# Patient Record
Sex: FEMALE | Race: WHITE | NOT HISPANIC OR LATINO | Employment: FULL TIME | URBAN - METROPOLITAN AREA
[De-identification: names, ages, dates, MRNs, and addresses within clinical notes are randomized per-mention and may not be internally consistent; named-entity substitution may affect disease eponyms.]

---

## 2023-03-10 ENCOUNTER — TELEPHONE (OUTPATIENT)
Dept: PRIMARY CARE | Facility: CLINIC | Age: 53
End: 2023-03-10
Payer: COMMERCIAL

## 2023-03-10 NOTE — TELEPHONE ENCOUNTER
Left a voicemail for the patient with the information below   ----- Message from Briseida White MD sent at 3/7/2023  5:32 PM EST -----  Left message for patient to call.  Her mammogram was normal, we will repeat it next year.

## 2023-08-15 ENCOUNTER — OFFICE VISIT (OUTPATIENT)
Dept: PRIMARY CARE | Facility: CLINIC | Age: 53
End: 2023-08-15
Payer: COMMERCIAL

## 2023-08-15 ENCOUNTER — TELEPHONE (OUTPATIENT)
Dept: PRIMARY CARE | Facility: CLINIC | Age: 53
End: 2023-08-15

## 2023-08-15 VITALS
DIASTOLIC BLOOD PRESSURE: 93 MMHG | HEART RATE: 80 BPM | HEIGHT: 61 IN | SYSTOLIC BLOOD PRESSURE: 136 MMHG | BODY MASS INDEX: 24.35 KG/M2 | TEMPERATURE: 97.9 F | OXYGEN SATURATION: 99 % | WEIGHT: 129 LBS

## 2023-08-15 DIAGNOSIS — M21.70 LEG LENGTH DISCREPANCY: Primary | ICD-10-CM

## 2023-08-15 DIAGNOSIS — Z11.59 NEED FOR HEPATITIS C SCREENING TEST: ICD-10-CM

## 2023-08-15 DIAGNOSIS — I10 BENIGN ESSENTIAL HYPERTENSION: ICD-10-CM

## 2023-08-15 PROBLEM — E04.2 MULTINODULAR THYROID: Status: ACTIVE | Noted: 2023-08-15

## 2023-08-15 PROBLEM — G47.33 OBSTRUCTIVE SLEEP APNEA: Status: ACTIVE | Noted: 2023-08-15

## 2023-08-15 PROBLEM — T78.3XXA ANGIOEDEMA OF LIPS: Status: ACTIVE | Noted: 2023-08-15

## 2023-08-15 PROBLEM — L40.9 PSORIASIS: Status: ACTIVE | Noted: 2023-08-15

## 2023-08-15 PROBLEM — F41.1 GAD (GENERALIZED ANXIETY DISORDER): Status: ACTIVE | Noted: 2023-08-15

## 2023-08-15 PROCEDURE — 99214 OFFICE O/P EST MOD 30 MIN: CPT | Performed by: FAMILY MEDICINE

## 2023-08-15 PROCEDURE — 3080F DIAST BP >= 90 MM HG: CPT | Performed by: FAMILY MEDICINE

## 2023-08-15 PROCEDURE — 3075F SYST BP GE 130 - 139MM HG: CPT | Performed by: FAMILY MEDICINE

## 2023-08-15 RX ORDER — CHLORTHALIDONE 25 MG/1
25 TABLET ORAL DAILY
Qty: 30 TABLET | Refills: 1 | Status: SHIPPED | OUTPATIENT
Start: 2023-08-15 | End: 2023-10-14

## 2023-08-15 ASSESSMENT — ENCOUNTER SYMPTOMS
UNEXPECTED WEIGHT CHANGE: 0
ABDOMINAL PAIN: 0
SLEEP DISTURBANCE: 1
SHORTNESS OF BREATH: 0
NECK PAIN: 1
PALPITATIONS: 0
COUGH: 0
FATIGUE: 1

## 2023-08-15 ASSESSMENT — PATIENT HEALTH QUESTIONNAIRE - PHQ9
1. LITTLE INTEREST OR PLEASURE IN DOING THINGS: NOT AT ALL
SUM OF ALL RESPONSES TO PHQ9 QUESTIONS 1 AND 2: 0
2. FEELING DOWN, DEPRESSED OR HOPELESS: NOT AT ALL

## 2023-08-15 NOTE — PROGRESS NOTES
" Subjective   Patient ID: Wanda Stearns is a 53 y.o. female who presents for Foot Orthotics (Pt needs referral for Orthotics ).    Wanda is here because she has a history of leg length discrepancy and needs a referral for PT to have custom orthotics.    She also needs follow up on her blood pressure.  She is struggling with her sleep apnea, is in process of being evaluated for the Inspire device because she has a really hard time tolerating the CPAP.  She stopped the atenolol because she is very tired to start with and felt the atenolol made her even more tired and sleepy.        Review of Systems   Constitutional:  Positive for fatigue. Negative for unexpected weight change.   Respiratory:  Negative for cough and shortness of breath.    Cardiovascular:  Negative for chest pain and palpitations.   Gastrointestinal:  Negative for abdominal pain.   Musculoskeletal:  Positive for neck pain.   Psychiatric/Behavioral:  Positive for sleep disturbance.        Objective     BP (!) 136/93   Pulse 80   Temp 36.6 °C (97.9 °F)   Ht 1.549 m (5' 1\")   Wt 58.5 kg (129 lb)   SpO2 99%   BMI 24.37 kg/m²     Physical Exam  Constitutional:       General: She is not in acute distress.  Neck:      Thyroid: No thyromegaly.   Cardiovascular:      Rate and Rhythm: Normal rate and regular rhythm.      Heart sounds: No murmur heard.  Pulmonary:      Effort: No respiratory distress.      Breath sounds: Normal breath sounds.   Lymphadenopathy:      Cervical: No cervical adenopathy.   Neurological:      Mental Status: She is alert.             Assessment/Plan   Problem List Items Addressed This Visit       Benign essential hypertension    Current Assessment & Plan     Blood pressure is too high.  Will start a small dose of chlorthalidone which is unlikely to cause fatigue.  Warned re muscle cramping.  Labs ordered.  F/U 1 month for recheck.         Relevant Medications    chlorthalidone (Hygroton) 25 mg tablet    Other Relevant Orders "    Lipid Panel    Comprehensive Metabolic Panel    TSH with reflex to Free T4 if abnormal    Leg length discrepancy - Primary    Relevant Orders    Referral to Physical Therapy     Other Visit Diagnoses       Need for hepatitis C screening test        Relevant Orders    Hepatitis C Antibody

## 2023-08-15 NOTE — ASSESSMENT & PLAN NOTE
Blood pressure is too high.  Will start a small dose of chlorthalidone which is unlikely to cause fatigue.  Warned re muscle cramping.  Labs ordered.  F/U 1 month for recheck.

## 2023-09-07 DIAGNOSIS — I10 BENIGN ESSENTIAL HYPERTENSION: ICD-10-CM

## 2023-09-07 RX ORDER — CHLORTHALIDONE 25 MG/1
25 TABLET ORAL DAILY
Qty: 30 TABLET | Refills: 1 | OUTPATIENT
Start: 2023-09-07

## 2023-09-21 ENCOUNTER — LAB (OUTPATIENT)
Dept: LAB | Facility: LAB | Age: 53
End: 2023-09-21
Payer: COMMERCIAL

## 2023-09-21 ENCOUNTER — OFFICE VISIT (OUTPATIENT)
Dept: PRIMARY CARE | Facility: CLINIC | Age: 53
End: 2023-09-21
Payer: COMMERCIAL

## 2023-09-21 VITALS
WEIGHT: 130 LBS | DIASTOLIC BLOOD PRESSURE: 85 MMHG | OXYGEN SATURATION: 98 % | SYSTOLIC BLOOD PRESSURE: 128 MMHG | TEMPERATURE: 98 F | HEIGHT: 61 IN | HEART RATE: 70 BPM | BODY MASS INDEX: 24.55 KG/M2

## 2023-09-21 DIAGNOSIS — I10 BENIGN ESSENTIAL HYPERTENSION: Primary | ICD-10-CM

## 2023-09-21 DIAGNOSIS — Z23 NEED FOR VACCINATION: ICD-10-CM

## 2023-09-21 DIAGNOSIS — I10 BENIGN ESSENTIAL HYPERTENSION: ICD-10-CM

## 2023-09-21 DIAGNOSIS — Z11.59 NEED FOR HEPATITIS C SCREENING TEST: ICD-10-CM

## 2023-09-21 LAB
ALANINE AMINOTRANSFERASE (SGPT) (U/L) IN SER/PLAS: 8 U/L (ref 7–45)
ALBUMIN (G/DL) IN SER/PLAS: 4.6 G/DL (ref 3.4–5)
ALKALINE PHOSPHATASE (U/L) IN SER/PLAS: 66 U/L (ref 33–110)
ANION GAP IN SER/PLAS: 13 MMOL/L (ref 10–20)
ASPARTATE AMINOTRANSFERASE (SGOT) (U/L) IN SER/PLAS: 14 U/L (ref 9–39)
BILIRUBIN TOTAL (MG/DL) IN SER/PLAS: 0.3 MG/DL (ref 0–1.2)
CALCIUM (MG/DL) IN SER/PLAS: 9.7 MG/DL (ref 8.6–10.6)
CARBON DIOXIDE, TOTAL (MMOL/L) IN SER/PLAS: 27 MMOL/L (ref 21–32)
CHLORIDE (MMOL/L) IN SER/PLAS: 105 MMOL/L (ref 98–107)
CHOLESTEROL (MG/DL) IN SER/PLAS: 205 MG/DL (ref 0–199)
CHOLESTEROL IN HDL (MG/DL) IN SER/PLAS: 79 MG/DL
CHOLESTEROL/HDL RATIO: 2.6
CREATININE (MG/DL) IN SER/PLAS: 0.73 MG/DL (ref 0.5–1.05)
GFR FEMALE: >90 ML/MIN/1.73M2
GLUCOSE (MG/DL) IN SER/PLAS: 88 MG/DL (ref 74–99)
HEPATITIS C VIRUS AB PRESENCE IN SERUM: NONREACTIVE
LDL: 113 MG/DL (ref 0–99)
POTASSIUM (MMOL/L) IN SER/PLAS: 4.2 MMOL/L (ref 3.5–5.3)
PROTEIN TOTAL: 7.2 G/DL (ref 6.4–8.2)
SODIUM (MMOL/L) IN SER/PLAS: 141 MMOL/L (ref 136–145)
THYROTROPIN (MIU/L) IN SER/PLAS BY DETECTION LIMIT <= 0.05 MIU/L: 1.4 MIU/L (ref 0.44–3.98)
TRIGLYCERIDE (MG/DL) IN SER/PLAS: 65 MG/DL (ref 0–149)
UREA NITROGEN (MG/DL) IN SER/PLAS: 14 MG/DL (ref 6–23)
VLDL: 13 MG/DL (ref 0–40)

## 2023-09-21 PROCEDURE — 3079F DIAST BP 80-89 MM HG: CPT | Performed by: FAMILY MEDICINE

## 2023-09-21 PROCEDURE — 86803 HEPATITIS C AB TEST: CPT

## 2023-09-21 PROCEDURE — 80061 LIPID PANEL: CPT

## 2023-09-21 PROCEDURE — 90471 IMMUNIZATION ADMIN: CPT | Performed by: FAMILY MEDICINE

## 2023-09-21 PROCEDURE — 36415 COLL VENOUS BLD VENIPUNCTURE: CPT

## 2023-09-21 PROCEDURE — 80053 COMPREHEN METABOLIC PANEL: CPT

## 2023-09-21 PROCEDURE — 99213 OFFICE O/P EST LOW 20 MIN: CPT | Performed by: FAMILY MEDICINE

## 2023-09-21 PROCEDURE — 90686 IIV4 VACC NO PRSV 0.5 ML IM: CPT | Performed by: FAMILY MEDICINE

## 2023-09-21 PROCEDURE — 3074F SYST BP LT 130 MM HG: CPT | Performed by: FAMILY MEDICINE

## 2023-09-21 PROCEDURE — 84443 ASSAY THYROID STIM HORMONE: CPT

## 2023-09-21 ASSESSMENT — ENCOUNTER SYMPTOMS
ABDOMINAL PAIN: 0
UNEXPECTED WEIGHT CHANGE: 0
SHORTNESS OF BREATH: 0
NECK PAIN: 1
COUGH: 0
FATIGUE: 1
SLEEP DISTURBANCE: 1
PALPITATIONS: 0

## 2023-09-21 ASSESSMENT — COLUMBIA-SUICIDE SEVERITY RATING SCALE - C-SSRS
2. HAVE YOU ACTUALLY HAD ANY THOUGHTS OF KILLING YOURSELF?: NO
6. HAVE YOU EVER DONE ANYTHING, STARTED TO DO ANYTHING, OR PREPARED TO DO ANYTHING TO END YOUR LIFE?: NO
1. IN THE PAST MONTH, HAVE YOU WISHED YOU WERE DEAD OR WISHED YOU COULD GO TO SLEEP AND NOT WAKE UP?: NO

## 2023-09-21 NOTE — ASSESSMENT & PLAN NOTE
Blood pressure is well controlled, will get her labs and make sure renal function is OK.  Plan to continue the same medication.

## 2023-09-21 NOTE — PROGRESS NOTES
" Subjective   Patient ID: Wanda Stearns is a 53 y.o. female who presents for Follow-up and Hypertension (F/U BP).    Wanda is here to follow up on her blood pressure.  She is struggling with her sleep apnea, is in process of being evaluated for the Inspire device because she has a really hard time tolerating the CPAP.  She reports the chlorthalidone is working well and is well tolerated.  She is under tremendous stress, her  was recently diagnosed with prostate cancer.        Review of Systems   Constitutional:  Positive for fatigue. Negative for unexpected weight change.   Respiratory:  Negative for cough and shortness of breath.    Cardiovascular:  Negative for chest pain and palpitations.   Gastrointestinal:  Negative for abdominal pain.   Musculoskeletal:  Positive for neck pain.   Psychiatric/Behavioral:  Positive for sleep disturbance.        Objective     /85   Pulse 70   Temp 36.7 °C (98 °F)   Ht 1.549 m (5' 1\")   Wt 59 kg (130 lb)   SpO2 98%   BMI 24.56 kg/m²     Physical Exam  Constitutional:       General: She is not in acute distress.  Neck:      Thyroid: No thyromegaly.   Cardiovascular:      Rate and Rhythm: Normal rate and regular rhythm.      Heart sounds: No murmur heard.  Pulmonary:      Effort: No respiratory distress.      Breath sounds: Normal breath sounds.   Lymphadenopathy:      Cervical: No cervical adenopathy.   Neurological:      Mental Status: She is alert.             Assessment/Plan   Problem List Items Addressed This Visit       Benign essential hypertension - Primary    Current Assessment & Plan     Blood pressure is well controlled, will get her labs and make sure renal function is OK.  Plan to continue the same medication.          Other Visit Diagnoses       Need for vaccination        Relevant Orders    Flu vaccine (IIV4) age 6 months and greater, preservative free (Completed)                "

## 2023-10-10 ENCOUNTER — HOSPITAL ENCOUNTER (OUTPATIENT)
Dept: RADIOLOGY | Facility: HOSPITAL | Age: 53
Discharge: HOME | End: 2023-10-10
Payer: COMMERCIAL

## 2023-10-10 DIAGNOSIS — G47.33 OBSTRUCTIVE SLEEP APNEA: ICD-10-CM

## 2023-10-10 DIAGNOSIS — G47.33 OBSTRUCTIVE SLEEP APNEA (ADULT) (PEDIATRIC): ICD-10-CM

## 2023-10-10 PROCEDURE — 70486 CT MAXILLOFACIAL W/O DYE: CPT | Performed by: RADIOLOGY

## 2023-10-10 PROCEDURE — 76377 3D RENDER W/INTRP POSTPROCES: CPT | Performed by: RADIOLOGY

## 2023-10-10 PROCEDURE — 76377 3D RENDER W/INTRP POSTPROCES: CPT

## 2023-10-10 PROCEDURE — 70486 CT MAXILLOFACIAL W/O DYE: CPT

## 2023-10-13 ENCOUNTER — TELEPHONE (OUTPATIENT)
Dept: OTOLARYNGOLOGY | Facility: HOSPITAL | Age: 53
End: 2023-10-13
Payer: COMMERCIAL

## 2023-10-13 NOTE — TELEPHONE ENCOUNTER
Patient scheduled for an appt 10/31st at 11:30. Time changed to 11am there was a conflict in the provider schedule. Left patient detailed vm with new time.

## 2023-10-29 PROBLEM — M54.2 PAIN, NECK: Status: ACTIVE | Noted: 2023-10-29

## 2023-10-29 PROBLEM — R92.8 ABNORMAL MAMMOGRAM: Status: ACTIVE | Noted: 2023-10-29

## 2023-10-29 PROBLEM — E04.9 GOITER: Status: ACTIVE | Noted: 2023-10-29

## 2023-10-29 PROBLEM — R53.83 OTHER FATIGUE: Status: ACTIVE | Noted: 2023-10-29

## 2023-10-29 RX ORDER — HYALURONIC ACID SODIUM SALT 0.5% / NIACINAMIDE 4% / TRETINOIN 0.05% .5; 4; .05 G/100G; G/100G; G/100G
CREAM TOPICAL NIGHTLY
COMMUNITY

## 2023-10-29 RX ORDER — BISMUTH SUBSALICYLATE 262 MG
1 TABLET,CHEWABLE ORAL DAILY
COMMUNITY

## 2023-10-29 RX ORDER — ATENOLOL 25 MG/1
25 TABLET ORAL DAILY
COMMUNITY

## 2023-10-29 RX ORDER — TRIAMCINOLONE ACETONIDE 1 MG/G
CREAM TOPICAL 2 TIMES DAILY
COMMUNITY

## 2023-10-30 NOTE — H&P (VIEW-ONLY)
Follow up visit prior to surgery    Patient presents today for pre-op appointment to Uvulopalatopharyngoplasty    The role of palatoplasty to reduce ARPIT burden was discussed. Risks including post-op bleeding, post-op significant pain, changes in taste and continuous tongue pain, amongst others were discussed and all questions answered.    Patient expresses understanding and after considering risks, benefits, and alternatives, surgical management was elected.     Orders   · CT Facial Bones; Status:Hold For - Scheduling,Retrospective By Protocol Authorization;  Requested for:20Sep2023;   Patient taking Metformin or Derivatives? : Unknown  Radiologist to Determine Optimal Study : Y  What are the patient's signs and symptoms? : ARPIT   · Pain Management Referral Evaluation and Treatment  Evaluate & Treat for post op pain  management due to pt prefers no narcotics after surgery UPPP due to recovering ETOH   Status: Hold For - Scheduling,Retrospective By Protocol Authorization  Requested for:  20Sep2023    Patient Discussion/Summary    This is a 53 year old female with a history of severe ARPIT and CPAP intolerance who presented to Dr. Corado for surgical options for ARPIT management. We discussed that given her anatomy, first line of treatment would include a UPPP. We counseled her that although we feel this procedure is clearly indicated, given her degree of apnea, we cannot guarantee that this will be entirely curative. She discussed with us her history of alcoholism and concern regarding postoperative pain control with narcotics after UPPP. She also has an allergy to NSAIDS. We will refer her to pain management to assist with this. We have also ordered a CT face to evaluate her prior genioplasty to help determine if she could benefit from bony work at the same time. Will see her back once more before surgery.      Chief Complaint    New patient visit for sleep apnea, intolerance to PAP, large tonsils and narrow airway     "  History of Present IllnessMs. STEARNS ,  1970, referred for an initial consultation with a long history of reported loud snoring, waking up feeling unrefreshed, excessive daytime fatigue. Grampian Sleepiness Scale Score is 16 /24. Fatigue Severity Scale Score is 43 /63. Snoring VAS 10/10    Sleep study histories: (personally reviewed raw data such as interpretation report, data sheet, hypnogram, and titration table)  The patient has been previously diagnosed with obstructive sleep apnea (ARPIT), in a home sleep test performed on 2023, that showed an Apnea Hypopnea Index (AHI) of 68.1 events per hour. The 3% oxygen desaturation index (RIK) was 66.1 events per hour. During that night, the lowest 02 saturation was 75 %, and the patient spent 60 minutes of total sleep time with 02 sat less than 88%. During supine sleep the AHI was 78.3 events per hour and non supine sleep was 35.7 events per hour    For nocturnal symptoms (without or prior to treatment), she endorses that there is  snoring, witnessed apneas, nocturia and restless sleep.    For daytime symptoms (without or prior to treatment), he endorses that there is morning headaches, irritability and depression.    Patient does reports nasal obstruction. It does fluctuate, and has been treated with nasal corticosteroids without significant improvements. He does not have a history of nasal surgery or upper airway surgery. He does not have a history of nasal trauma. He does not describe a history of facial pressure type of headaches. He does not report a history of nasal drainage, denies epistaxis.   NOSE-   Nasal Obstruction VAS- 1/10    Ms. Stearns presents with severe ARPIT, she is compliant but intolerant to CPAP. She feels fatigued throughout the day despite wearing the CPAP. She has a history of rhinoplasty and genioplasty in the past. Her mother has a \"congenital narrowing of the airway\" and she feels she has a similar condition. She denies " any nasal obstruction, breathes well through her nose. She had braces as a child and still wears a retainer. She has not tried an oral appliance. 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 Smokes 2-3 cigarettes per day.      Active Problems   · Abnormal mammogram (793.80) (R92.8)   · Angioedema of lips, initial encounter (995.1) (T78.3XXA)   · Benign essential hypertension (401.1) (I10)   · Encounter for screening mammogram for breast cancer (V76.12) (Z12.31)   · BOB (generalized anxiety disorder) (300.02) (F41.1)   · Goiter (240.9) (E04.9)   · Lower limb length difference (736.81) (M21.70)   · Menopause (627.2) (Z78.0)   · Multinodular thyroid (241.1) (E04.2)   · Obstructive sleep apnea (327.23) (G47.33)   · Other fatigue (780.79) (R53.83)   · Pain, neck (723.1) (M54.2)   · Psoriasis (696.1) (L40.9)    Past Medical History   · History of varicella (V12.09) (Z86.19)    Surgical History   · History of Repair Of Extraoral Mandible With Bone Plate   · History of Rhinoplasty    Family History   · Family history of hypertension (V17.49) (Z82.49)   · Family history of hypothyroidism (V18.19) (Z83.49)   · Family history of Seasonal allergic rhinitis, unspecified allergic rhinitis trigger   · Family history of Alcohol abuse, in remission   · Family history of Anxiety   · Family history of Diabetes mellitus of other type without complication   · Family history of depression (V17.0) (Z81.8)   · Family history of hypertension (V17.49) (Z82.49)   · Family history of Obsessive-compulsive behavior   · Family history of Seasonal allergic rhinitis, unspecified allergic rhinitis trigger   · Family history of Anxiety   · Family history of depression (V17.0) (Z81.8)   · Family history of hemochromatosis (V18.19) (Z83.49)   · Family history of dementia (V17.2) (Z81.8)   · Family history of Alcohol abuse   · Family history of Alcohol abuse   · Family history of Anxiety   · Family history of depression (V17.0) (Z81.8)   · Family  history of Anxiety   · Family history of depression (V17.0) (Z81.8)    Social History   · Alcoholism in recovery (303.93) (F10.21)   · Caffeine use (V49.89) (Z78.9)   · Current smoker on some days (305.1) (F17.200)   · Does not use illicit drugs (V49.89) (Z78.9)   · Denied: History of Drug use   · Feels safe at home   · No alcohol use    Allergies   · Ibuprofen TABS  Recorded By: Carolyn Hirsch; 12/22/2016 4:27:59 PM    Current Meds    Medication Name Instruction   Atenolol 25 MG Oral Tablet TAKE 1 TABLET DAILY.   EPINEPHrine 0.3 MG/0.3ML Injection Solution Auto-injector USE AS DIRECTED   Mometasone Furoate 0.1 % External Ointment APPLY THIN LAYER TO AFFECTED AREAS ON FACE TWICE A DAY X 7 DAYS, THEN DISCONTINUE.   Multi-Vitamin/Iron Oral Tablet TAKE 1 TABLET ONCE DAILY.   Tretinoin 0.05 % External Cream APPLY A PEA SIZED AMOUNT TO ENTIRE FACE EVERY NIGHT   Triamcinolone Acetonide 0.1 % External Cream APPLY TO AFFECTED AREA TWICE A DAY     Vitals  Vital Signs    Recorded: [id number] 11:10AM   Temperature 98.3 F   Weight 129 lb 9.6 oz   BMI Calculated 24.49 kg/m2   BSA Calculated 1.57   Tobacco Use a) Yes   PHQ-2  #1. Over the last 2 weeks have you felt down, depressed or hopeless?  (If yes, answer PHQ-9 below) Yes   PHQ-2  #2. Over the last 2 weeks have you felt little interest  or pleasure in doing things?  (If yes, answer PHQ-9 below) Yes   Falls Screening (Age 18+) a) No falls within the last year     Physical Exam  GENERAL: Well-developed, well-nourished.      EYES: Ocular motility intact.      EARS: Otoscopy of external auditory canals and tympanic membranes is normal with clinical speech reception thresholds grossly intact. No mass/lesion of auricle.      NOSE:   Anterior rhinoscopy shows there is left septal deviation and bilateral inferior turbinate hypertrophy.  Right Turbinate: 2  Left Turbinate: 2      NECK: No cervical lymphadenopathy, no neck mass/crepitus/ asymmetry, trachea is midline, no thyroid  enlargement/tenderness/mass.      OROPHARYNX:   Tonsil size: 3+  Modified Mallampatti tongue position: 2  Tongue position is Freidman 2  Scalloping is noted.   Soft Palate: is midly high arched    MAXILLOFACIAL:   On frontal repose, patient shows symmetrical facial thirds with retrusion of maxilla. Symmetrical facial fifths.  There is not paranasal flattening.  There is not deep nasolabial folds.       DENTAL:  The occlusal plane is not steep. Overjet and Overbite are adequate.   Right - Class 2 canine, Class 2 molar  Left - Class 2 canine, Class 2 molar  There is transverse discrepancy with narrow maxilla.  The maxilla is highly-arched.   Tongue shows teeth indentations.   Dentition: fair     NEURO/PSYCH: Cranial nerves grossly intact, oriented x3 (time, place, person), appropriate mood and affect.      RESPIRATION: Symmetric expansion during respiration, normal respiratory effort.      CARDIOVASCULAR: Pulse is regular rhythm and rate      Procedure: Diagnostic Nasal/ Pharyngeal Endoscopy     Indication for procedure: To evaluate static and dynamic upper anatomy not visible by anterior rhinoscopy and oral cavitiy examination for anatomic risk factors of obstructive sleep apnea. Also to evaluate extension of polyposis.      Anesthesia: 1% phenylephrine,4% lidocaine topical spray     Description:   A flexible endoscope was used to examine the left and right nasal cavities. The nasal valve areas were examined for abnormalities or collapse. The inferior turbinates are significantly hypertrophied. Several polyps in both meatus are noted with drainage of thick mucus from middle and superior meatuses. The scope was then passed through the nasopharyngeal, oropharyngeal, and hypopharyngeal airway. The Aparicio Maneuver was performed with the patient in sitting position.Collapse was assessed during a maximal inspiratory effort against a closed mouth and sealed nose. The patient tolerated the procedure without complications and  was returned to ambulatory status.      Nasopharynx: There is not adenoid hypertrophy.   Oropharynx: There is severe palatine tonsillar hypertrophy.   Hypopharynx: There is moderate lingual tonsillar hypertrophy, with lingual tonsils of Grade 1. Vocal Cord position with Grade 1 view. With Mehta maneuver, base of tongue collapse is grade 1.          'Scores and Scales'  -Sleep QOL Set    Goal [id number]   NOSE (20) TS     Snoring VAS TS     ESS (24) TS  11   GLENDA (28) TS  22   FOSQ (40) TS  26

## 2023-10-30 NOTE — PROGRESS NOTES
Follow up visit prior to surgery    Patient presents today for pre-op appointment to Uvulopalatopharyngoplasty    The role of palatoplasty to reduce ARPIT burden was discussed. Risks including post-op bleeding, post-op significant pain, changes in taste and continuous tongue pain, amongst others were discussed and all questions answered.    Patient expresses understanding and after considering risks, benefits, and alternatives, surgical management was elected.     Orders   · CT Facial Bones; Status:Hold For - Scheduling,Retrospective By Protocol Authorization;  Requested for:20Sep2023;   Patient taking Metformin or Derivatives? : Unknown  Radiologist to Determine Optimal Study : Y  What are the patient's signs and symptoms? : ARPIT   · Pain Management Referral Evaluation and Treatment  Evaluate & Treat for post op pain  management due to pt prefers no narcotics after surgery UPPP due to recovering ETOH   Status: Hold For - Scheduling,Retrospective By Protocol Authorization  Requested for:  20Sep2023    Patient Discussion/Summary    This is a 53 year old female with a history of severe ARPIT and CPAP intolerance who presented to Dr. Corado for surgical options for ARPIT management. We discussed that given her anatomy, first line of treatment would include a UPPP. We counseled her that although we feel this procedure is clearly indicated, given her degree of apnea, we cannot guarantee that this will be entirely curative. She discussed with us her history of alcoholism and concern regarding postoperative pain control with narcotics after UPPP. She also has an allergy to NSAIDS. We will refer her to pain management to assist with this. We have also ordered a CT face to evaluate her prior genioplasty to help determine if she could benefit from bony work at the same time. Will see her back once more before surgery.      Chief Complaint    New patient visit for sleep apnea, intolerance to PAP, large tonsils and narrow airway     "  History of Present IllnessMs. STEARNS ,  1970, referred for an initial consultation with a long history of reported loud snoring, waking up feeling unrefreshed, excessive daytime fatigue. Madison Sleepiness Scale Score is 16 /24. Fatigue Severity Scale Score is 43 /63. Snoring VAS 10/10    Sleep study histories: (personally reviewed raw data such as interpretation report, data sheet, hypnogram, and titration table)  The patient has been previously diagnosed with obstructive sleep apnea (ARPIT), in a home sleep test performed on 2023, that showed an Apnea Hypopnea Index (AHI) of 68.1 events per hour. The 3% oxygen desaturation index (RIK) was 66.1 events per hour. During that night, the lowest 02 saturation was 75 %, and the patient spent 60 minutes of total sleep time with 02 sat less than 88%. During supine sleep the AHI was 78.3 events per hour and non supine sleep was 35.7 events per hour    For nocturnal symptoms (without or prior to treatment), she endorses that there is  snoring, witnessed apneas, nocturia and restless sleep.    For daytime symptoms (without or prior to treatment), he endorses that there is morning headaches, irritability and depression.    Patient does reports nasal obstruction. It does fluctuate, and has been treated with nasal corticosteroids without significant improvements. He does not have a history of nasal surgery or upper airway surgery. He does not have a history of nasal trauma. He does not describe a history of facial pressure type of headaches. He does not report a history of nasal drainage, denies epistaxis.   NOSE-   Nasal Obstruction VAS- 1/10    Ms. Stearns presents with severe ARPIT, she is compliant but intolerant to CPAP. She feels fatigued throughout the day despite wearing the CPAP. She has a history of rhinoplasty and genioplasty in the past. Her mother has a \"congenital narrowing of the airway\" and she feels she has a similar condition. She denies " any nasal obstruction, breathes well through her nose. She had braces as a child and still wears a retainer. She has not tried an oral appliance. 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 Smokes 2-3 cigarettes per day.      Active Problems   · Abnormal mammogram (793.80) (R92.8)   · Angioedema of lips, initial encounter (995.1) (T78.3XXA)   · Benign essential hypertension (401.1) (I10)   · Encounter for screening mammogram for breast cancer (V76.12) (Z12.31)   · BOB (generalized anxiety disorder) (300.02) (F41.1)   · Goiter (240.9) (E04.9)   · Lower limb length difference (736.81) (M21.70)   · Menopause (627.2) (Z78.0)   · Multinodular thyroid (241.1) (E04.2)   · Obstructive sleep apnea (327.23) (G47.33)   · Other fatigue (780.79) (R53.83)   · Pain, neck (723.1) (M54.2)   · Psoriasis (696.1) (L40.9)    Past Medical History   · History of varicella (V12.09) (Z86.19)    Surgical History   · History of Repair Of Extraoral Mandible With Bone Plate   · History of Rhinoplasty    Family History   · Family history of hypertension (V17.49) (Z82.49)   · Family history of hypothyroidism (V18.19) (Z83.49)   · Family history of Seasonal allergic rhinitis, unspecified allergic rhinitis trigger   · Family history of Alcohol abuse, in remission   · Family history of Anxiety   · Family history of Diabetes mellitus of other type without complication   · Family history of depression (V17.0) (Z81.8)   · Family history of hypertension (V17.49) (Z82.49)   · Family history of Obsessive-compulsive behavior   · Family history of Seasonal allergic rhinitis, unspecified allergic rhinitis trigger   · Family history of Anxiety   · Family history of depression (V17.0) (Z81.8)   · Family history of hemochromatosis (V18.19) (Z83.49)   · Family history of dementia (V17.2) (Z81.8)   · Family history of Alcohol abuse   · Family history of Alcohol abuse   · Family history of Anxiety   · Family history of depression (V17.0) (Z81.8)   · Family  history of Anxiety   · Family history of depression (V17.0) (Z81.8)    Social History   · Alcoholism in recovery (303.93) (F10.21)   · Caffeine use (V49.89) (Z78.9)   · Current smoker on some days (305.1) (F17.200)   · Does not use illicit drugs (V49.89) (Z78.9)   · Denied: History of Drug use   · Feels safe at home   · No alcohol use    Allergies   · Ibuprofen TABS  Recorded By: Carolyn Hirsch; 12/22/2016 4:27:59 PM    Current Meds    Medication Name Instruction   Atenolol 25 MG Oral Tablet TAKE 1 TABLET DAILY.   EPINEPHrine 0.3 MG/0.3ML Injection Solution Auto-injector USE AS DIRECTED   Mometasone Furoate 0.1 % External Ointment APPLY THIN LAYER TO AFFECTED AREAS ON FACE TWICE A DAY X 7 DAYS, THEN DISCONTINUE.   Multi-Vitamin/Iron Oral Tablet TAKE 1 TABLET ONCE DAILY.   Tretinoin 0.05 % External Cream APPLY A PEA SIZED AMOUNT TO ENTIRE FACE EVERY NIGHT   Triamcinolone Acetonide 0.1 % External Cream APPLY TO AFFECTED AREA TWICE A DAY     Vitals  Vital Signs    Recorded: [id number] 11:10AM   Temperature 98.3 F   Weight 129 lb 9.6 oz   BMI Calculated 24.49 kg/m2   BSA Calculated 1.57   Tobacco Use a) Yes   PHQ-2  #1. Over the last 2 weeks have you felt down, depressed or hopeless?  (If yes, answer PHQ-9 below) Yes   PHQ-2  #2. Over the last 2 weeks have you felt little interest  or pleasure in doing things?  (If yes, answer PHQ-9 below) Yes   Falls Screening (Age 18+) a) No falls within the last year     Physical Exam  GENERAL: Well-developed, well-nourished.      EYES: Ocular motility intact.      EARS: Otoscopy of external auditory canals and tympanic membranes is normal with clinical speech reception thresholds grossly intact. No mass/lesion of auricle.      NOSE:   Anterior rhinoscopy shows there is left septal deviation and bilateral inferior turbinate hypertrophy.  Right Turbinate: 2  Left Turbinate: 2      NECK: No cervical lymphadenopathy, no neck mass/crepitus/ asymmetry, trachea is midline, no thyroid  enlargement/tenderness/mass.      OROPHARYNX:   Tonsil size: 3+  Modified Mallampatti tongue position: 2  Tongue position is Freidman 2  Scalloping is noted.   Soft Palate: is midly high arched    MAXILLOFACIAL:   On frontal repose, patient shows symmetrical facial thirds with retrusion of maxilla. Symmetrical facial fifths.  There is not paranasal flattening.  There is not deep nasolabial folds.       DENTAL:  The occlusal plane is not steep. Overjet and Overbite are adequate.   Right - Class 2 canine, Class 2 molar  Left - Class 2 canine, Class 2 molar  There is transverse discrepancy with narrow maxilla.  The maxilla is highly-arched.   Tongue shows teeth indentations.   Dentition: fair     NEURO/PSYCH: Cranial nerves grossly intact, oriented x3 (time, place, person), appropriate mood and affect.      RESPIRATION: Symmetric expansion during respiration, normal respiratory effort.      CARDIOVASCULAR: Pulse is regular rhythm and rate      Procedure: Diagnostic Nasal/ Pharyngeal Endoscopy     Indication for procedure: To evaluate static and dynamic upper anatomy not visible by anterior rhinoscopy and oral cavitiy examination for anatomic risk factors of obstructive sleep apnea. Also to evaluate extension of polyposis.      Anesthesia: 1% phenylephrine,4% lidocaine topical spray     Description:   A flexible endoscope was used to examine the left and right nasal cavities. The nasal valve areas were examined for abnormalities or collapse. The inferior turbinates are significantly hypertrophied. Several polyps in both meatus are noted with drainage of thick mucus from middle and superior meatuses. The scope was then passed through the nasopharyngeal, oropharyngeal, and hypopharyngeal airway. The Aparicio Maneuver was performed with the patient in sitting position.Collapse was assessed during a maximal inspiratory effort against a closed mouth and sealed nose. The patient tolerated the procedure without complications and  was returned to ambulatory status.      Nasopharynx: There is not adenoid hypertrophy.   Oropharynx: There is severe palatine tonsillar hypertrophy.   Hypopharynx: There is moderate lingual tonsillar hypertrophy, with lingual tonsils of Grade 1. Vocal Cord position with Grade 1 view. With Mehta maneuver, base of tongue collapse is grade 1.          'Scores and Scales'  -Sleep QOL Set    Goal [id number]   NOSE (20) TS     Snoring VAS TS     ESS (24) TS  11   GLENDA (28) TS  22   FOSQ (40) TS  26

## 2023-10-31 ENCOUNTER — OFFICE VISIT (OUTPATIENT)
Dept: OTOLARYNGOLOGY | Facility: CLINIC | Age: 53
End: 2023-10-31
Payer: COMMERCIAL

## 2023-10-31 VITALS — TEMPERATURE: 94.3 F | BODY MASS INDEX: 24.64 KG/M2 | HEIGHT: 61 IN | WEIGHT: 130.5 LBS

## 2023-10-31 DIAGNOSIS — G47.33 OSA (OBSTRUCTIVE SLEEP APNEA): Primary | ICD-10-CM

## 2023-10-31 PROCEDURE — 99214 OFFICE O/P EST MOD 30 MIN: CPT

## 2023-10-31 PROCEDURE — 3074F SYST BP LT 130 MM HG: CPT

## 2023-10-31 PROCEDURE — 3079F DIAST BP 80-89 MM HG: CPT

## 2023-10-31 ASSESSMENT — PATIENT HEALTH QUESTIONNAIRE - PHQ9
SUM OF ALL RESPONSES TO PHQ9 QUESTIONS 1 & 2: 0
1. LITTLE INTEREST OR PLEASURE IN DOING THINGS: NOT AT ALL
2. FEELING DOWN, DEPRESSED OR HOPELESS: NOT AT ALL

## 2023-11-09 ENCOUNTER — LAB (OUTPATIENT)
Dept: LAB | Facility: LAB | Age: 53
End: 2023-11-09
Payer: COMMERCIAL

## 2023-11-09 ENCOUNTER — TELEPHONE (OUTPATIENT)
Dept: OTOLARYNGOLOGY | Facility: HOSPITAL | Age: 53
End: 2023-11-09

## 2023-11-09 DIAGNOSIS — Z01.818 PRE-OP TESTING: ICD-10-CM

## 2023-11-09 DIAGNOSIS — H95.21: ICD-10-CM

## 2023-11-09 LAB
ERYTHROCYTE [DISTWIDTH] IN BLOOD BY AUTOMATED COUNT: 11.7 % (ref 11.5–14.5)
HCT VFR BLD AUTO: 39.6 % (ref 36–46)
HGB BLD-MCNC: 13.3 G/DL (ref 12–16)
INR PPP: 0.9 (ref 0.9–1.1)
MCH RBC QN AUTO: 32.3 PG (ref 26–34)
MCHC RBC AUTO-ENTMCNC: 33.6 G/DL (ref 32–36)
MCV RBC AUTO: 96 FL (ref 80–100)
NRBC BLD-RTO: 0 /100 WBCS (ref 0–0)
PLATELET # BLD AUTO: 280 X10*3/UL (ref 150–450)
PROTHROMBIN TIME: 10.6 SECONDS (ref 9.8–12.8)
RBC # BLD AUTO: 4.12 X10*6/UL (ref 4–5.2)
WBC # BLD AUTO: 6.5 X10*3/UL (ref 4.4–11.3)

## 2023-11-09 PROCEDURE — 85610 PROTHROMBIN TIME: CPT

## 2023-11-09 PROCEDURE — 36415 COLL VENOUS BLD VENIPUNCTURE: CPT

## 2023-11-09 PROCEDURE — 85027 COMPLETE CBC AUTOMATED: CPT

## 2023-11-09 NOTE — TELEPHONE ENCOUNTER
Topic: pre op labs    Pt called Dr Corado's office asking what labs are needed for surgery on 11/13.  I called [facility] and Kimi states pt has a good CMP but would need a CBC and PT/INR. Order was placed    I called pt and she is aware of needed lab work and will go to her local  lab today.

## 2023-11-10 ENCOUNTER — ANESTHESIA EVENT (OUTPATIENT)
Dept: OPERATING ROOM | Facility: HOSPITAL | Age: 53
End: 2023-11-10
Payer: COMMERCIAL

## 2023-11-10 NOTE — PREPROCEDURE INSTRUCTIONS
Current Medications   Medication Instructions    atenolol (Tenormin) 25 mg tablet Take as prescribed    EPINEPHrine 0.3 mg/0.3 mL injection syringe PRN    multivitamin tablet Stop 7 days before surgery    NON FORMULARY Stop night before surgery     NPO Instructions:  Additional Instructions:   Enter through main entrance of [facility] hospital building, located at [address] [facility]. Proceed to registration, located in the back right hand corner. You will need your ID and insurance card for registration. Please ensure you have a responsible adult to drive you home.     Shower the morning of or night before your procedure. After you shower avoid lotions, powders, deodorants or anything applied to the skin. If you wear contacts or glasses, wear the glasses. If you do not have glasses, please bring a case for your contacts. You may wear hearing aids and dentures, bring a case for them or we will provide one. Make sure you wear something loose and comfortable. Keep in mind your surgery type and wear something that will accommodate incisions or bandages. Please remove all jewelry.     Nothing to eat or drink after midnight (including coffee, tea, gum etc). You may take medications discussed during phone call with a small sip of water.    For further questions [facility] can be contacted at [phone] between 7AM-3PM.

## 2023-11-13 ENCOUNTER — HOSPITAL ENCOUNTER (OUTPATIENT)
Facility: HOSPITAL | Age: 53
Setting detail: OUTPATIENT SURGERY
Discharge: HOME | End: 2023-11-13
Payer: COMMERCIAL

## 2023-11-13 ENCOUNTER — ANESTHESIA (OUTPATIENT)
Dept: OPERATING ROOM | Facility: HOSPITAL | Age: 53
End: 2023-11-13
Payer: COMMERCIAL

## 2023-11-13 VITALS
TEMPERATURE: 97.2 F | DIASTOLIC BLOOD PRESSURE: 90 MMHG | RESPIRATION RATE: 16 BRPM | HEART RATE: 68 BPM | SYSTOLIC BLOOD PRESSURE: 155 MMHG | OXYGEN SATURATION: 97 %

## 2023-11-13 DIAGNOSIS — G47.33 OBSTRUCTIVE SLEEP APNEA: Primary | ICD-10-CM

## 2023-11-13 DIAGNOSIS — G47.33 OBSTRUCTIVE SLEEP APNEA (ADULT) (PEDIATRIC): ICD-10-CM

## 2023-11-13 LAB — PREGNANCY TEST URINE, POC: NEGATIVE

## 2023-11-13 PROCEDURE — 88304 TISSUE EXAM BY PATHOLOGIST: CPT | Mod: TC

## 2023-11-13 PROCEDURE — 42145 REPAIR PALATE PHARYNX/UVULA: CPT

## 2023-11-13 PROCEDURE — 2500000004 HC RX 250 GENERAL PHARMACY W/ HCPCS (ALT 636 FOR OP/ED): Performed by: ANESTHESIOLOGY

## 2023-11-13 PROCEDURE — 42950 RECONSTRUCTION OF THROAT: CPT

## 2023-11-13 PROCEDURE — 88304 TISSUE EXAM BY PATHOLOGIST: CPT | Performed by: PATHOLOGY

## 2023-11-13 PROCEDURE — 3700000002 HC GENERAL ANESTHESIA TIME - EACH INCREMENTAL 1 MINUTE

## 2023-11-13 PROCEDURE — 2500000005 HC RX 250 GENERAL PHARMACY W/O HCPCS: Performed by: ANESTHESIOLOGIST ASSISTANT

## 2023-11-13 PROCEDURE — 2720000007 HC OR 272 NO HCPCS

## 2023-11-13 PROCEDURE — 2500000004 HC RX 250 GENERAL PHARMACY W/ HCPCS (ALT 636 FOR OP/ED): Performed by: STUDENT IN AN ORGANIZED HEALTH CARE EDUCATION/TRAINING PROGRAM

## 2023-11-13 PROCEDURE — 3600000007 HC OR TIME - EACH INCREMENTAL 1 MINUTE - PROCEDURE LEVEL TWO

## 2023-11-13 PROCEDURE — 2500000004 HC RX 250 GENERAL PHARMACY W/ HCPCS (ALT 636 FOR OP/ED): Performed by: ANESTHESIOLOGIST ASSISTANT

## 2023-11-13 PROCEDURE — A4217 STERILE WATER/SALINE, 500 ML: HCPCS

## 2023-11-13 PROCEDURE — 93010 ELECTROCARDIOGRAM REPORT: CPT | Performed by: INTERNAL MEDICINE

## 2023-11-13 PROCEDURE — 2500000004 HC RX 250 GENERAL PHARMACY W/ HCPCS (ALT 636 FOR OP/ED)

## 2023-11-13 PROCEDURE — 7100000001 HC RECOVERY ROOM TIME - INITIAL BASE CHARGE

## 2023-11-13 PROCEDURE — A42145 PR PALATOPHAYNGOPLASTY: Performed by: ANESTHESIOLOGIST ASSISTANT

## 2023-11-13 PROCEDURE — 3600000002 HC OR TIME - INITIAL BASE CHARGE - PROCEDURE LEVEL TWO

## 2023-11-13 PROCEDURE — 7100000010 HC PHASE TWO TIME - EACH INCREMENTAL 1 MINUTE

## 2023-11-13 PROCEDURE — 7100000009 HC PHASE TWO TIME - INITIAL BASE CHARGE

## 2023-11-13 PROCEDURE — A42145 PR PALATOPHAYNGOPLASTY: Performed by: ANESTHESIOLOGY

## 2023-11-13 PROCEDURE — 88304 TISSUE EXAM BY PATHOLOGIST: CPT | Mod: TC,SUR

## 2023-11-13 PROCEDURE — 3700000001 HC GENERAL ANESTHESIA TIME - INITIAL BASE CHARGE

## 2023-11-13 PROCEDURE — 7100000002 HC RECOVERY ROOM TIME - EACH INCREMENTAL 1 MINUTE

## 2023-11-13 RX ORDER — ONDANSETRON HYDROCHLORIDE 2 MG/ML
INJECTION, SOLUTION INTRAVENOUS AS NEEDED
Status: DISCONTINUED | OUTPATIENT
Start: 2023-11-13 | End: 2023-11-13

## 2023-11-13 RX ORDER — CEFAZOLIN SODIUM 2 G/100ML
INJECTION, SOLUTION INTRAVENOUS
Status: COMPLETED
Start: 2023-11-13 | End: 2023-11-13

## 2023-11-13 RX ORDER — CEFAZOLIN SODIUM 2 G/100ML
2 INJECTION, SOLUTION INTRAVENOUS ONCE
Status: COMPLETED | OUTPATIENT
Start: 2023-11-13 | End: 2023-11-13

## 2023-11-13 RX ORDER — ESMOLOL HYDROCHLORIDE 10 MG/ML
INJECTION INTRAVENOUS AS NEEDED
Status: DISCONTINUED | OUTPATIENT
Start: 2023-11-13 | End: 2023-11-13

## 2023-11-13 RX ORDER — ONDANSETRON HYDROCHLORIDE 4 MG/5ML
8 SOLUTION ORAL EVERY 8 HOURS PRN
Qty: 150 ML | Refills: 0 | Status: SHIPPED | OUTPATIENT
Start: 2023-11-13

## 2023-11-13 RX ORDER — ROCURONIUM BROMIDE 10 MG/ML
INJECTION, SOLUTION INTRAVENOUS AS NEEDED
Status: DISCONTINUED | OUTPATIENT
Start: 2023-11-13 | End: 2023-11-13

## 2023-11-13 RX ORDER — SODIUM CHLORIDE 0.9 G/100ML
IRRIGANT IRRIGATION AS NEEDED
Status: DISCONTINUED | OUTPATIENT
Start: 2023-11-13 | End: 2023-11-13 | Stop reason: HOSPADM

## 2023-11-13 RX ORDER — DEXAMETHASONE SODIUM PHOSPHATE 4 MG/ML
INJECTION, SOLUTION INTRA-ARTICULAR; INTRALESIONAL; INTRAMUSCULAR; INTRAVENOUS; SOFT TISSUE AS NEEDED
Status: DISCONTINUED | OUTPATIENT
Start: 2023-11-13 | End: 2023-11-13

## 2023-11-13 RX ORDER — LIDOCAINE HYDROCHLORIDE 10 MG/ML
0.1 INJECTION, SOLUTION EPIDURAL; INFILTRATION; INTRACAUDAL; PERINEURAL ONCE
Status: DISCONTINUED | OUTPATIENT
Start: 2023-11-13 | End: 2023-11-13 | Stop reason: HOSPADM

## 2023-11-13 RX ORDER — ACETAMINOPHEN 325 MG/1
650 TABLET ORAL EVERY 6 HOURS PRN
Qty: 112 TABLET | Refills: 0 | Status: SHIPPED | OUTPATIENT
Start: 2023-11-13 | End: 2023-11-27

## 2023-11-13 RX ORDER — FENTANYL CITRATE 50 UG/ML
INJECTION, SOLUTION INTRAMUSCULAR; INTRAVENOUS AS NEEDED
Status: DISCONTINUED | OUTPATIENT
Start: 2023-11-13 | End: 2023-11-13

## 2023-11-13 RX ORDER — OXYCODONE HCL 10 MG/1
10 TABLET, FILM COATED, EXTENDED RELEASE ORAL EVERY 12 HOURS
Qty: 14 TABLET | Refills: 0 | Status: SHIPPED | OUTPATIENT
Start: 2023-11-13 | End: 2023-11-20

## 2023-11-13 RX ORDER — MIDAZOLAM HYDROCHLORIDE 1 MG/ML
INJECTION, SOLUTION INTRAMUSCULAR; INTRAVENOUS AS NEEDED
Status: DISCONTINUED | OUTPATIENT
Start: 2023-11-13 | End: 2023-11-13

## 2023-11-13 RX ORDER — LABETALOL HYDROCHLORIDE 5 MG/ML
5 INJECTION, SOLUTION INTRAVENOUS ONCE AS NEEDED
Status: DISCONTINUED | OUTPATIENT
Start: 2023-11-13 | End: 2023-11-13 | Stop reason: HOSPADM

## 2023-11-13 RX ORDER — MEPERIDINE HYDROCHLORIDE 25 MG/ML
12.5 INJECTION INTRAMUSCULAR; INTRAVENOUS; SUBCUTANEOUS EVERY 10 MIN PRN
Status: DISCONTINUED | OUTPATIENT
Start: 2023-11-13 | End: 2023-11-13 | Stop reason: HOSPADM

## 2023-11-13 RX ORDER — ONDANSETRON HYDROCHLORIDE 2 MG/ML
4 INJECTION, SOLUTION INTRAVENOUS ONCE AS NEEDED
Status: DISCONTINUED | OUTPATIENT
Start: 2023-11-13 | End: 2023-11-13 | Stop reason: HOSPADM

## 2023-11-13 RX ORDER — PROPOFOL 10 MG/ML
INJECTION, EMULSION INTRAVENOUS AS NEEDED
Status: DISCONTINUED | OUTPATIENT
Start: 2023-11-13 | End: 2023-11-13

## 2023-11-13 RX ORDER — ACETAMINOPHEN 325 MG/1
650 TABLET ORAL EVERY 4 HOURS PRN
Status: DISCONTINUED | OUTPATIENT
Start: 2023-11-13 | End: 2023-11-13 | Stop reason: HOSPADM

## 2023-11-13 RX ORDER — HYDRALAZINE HYDROCHLORIDE 20 MG/ML
5 INJECTION INTRAMUSCULAR; INTRAVENOUS EVERY 30 MIN PRN
Status: DISCONTINUED | OUTPATIENT
Start: 2023-11-13 | End: 2023-11-13 | Stop reason: HOSPADM

## 2023-11-13 RX ORDER — LIDOCAINE HCL/PF 100 MG/5ML
SYRINGE (ML) INTRAVENOUS AS NEEDED
Status: DISCONTINUED | OUTPATIENT
Start: 2023-11-13 | End: 2023-11-13

## 2023-11-13 RX ORDER — SODIUM CHLORIDE, SODIUM LACTATE, POTASSIUM CHLORIDE, CALCIUM CHLORIDE 600; 310; 30; 20 MG/100ML; MG/100ML; MG/100ML; MG/100ML
100 INJECTION, SOLUTION INTRAVENOUS CONTINUOUS
Status: DISCONTINUED | OUTPATIENT
Start: 2023-11-13 | End: 2023-11-13 | Stop reason: HOSPADM

## 2023-11-13 RX ORDER — ALBUTEROL SULFATE 0.83 MG/ML
2.5 SOLUTION RESPIRATORY (INHALATION) ONCE AS NEEDED
Status: DISCONTINUED | OUTPATIENT
Start: 2023-11-13 | End: 2023-11-13 | Stop reason: HOSPADM

## 2023-11-13 RX ORDER — LABETALOL HYDROCHLORIDE 5 MG/ML
INJECTION, SOLUTION INTRAVENOUS AS NEEDED
Status: DISCONTINUED | OUTPATIENT
Start: 2023-11-13 | End: 2023-11-13

## 2023-11-13 RX ADMIN — SODIUM CHLORIDE, POTASSIUM CHLORIDE, SODIUM LACTATE AND CALCIUM CHLORIDE: 600; 310; 30; 20 INJECTION, SOLUTION INTRAVENOUS at 07:29

## 2023-11-13 RX ADMIN — ESMOLOL HYDROCHLORIDE 20 MG: 10 INJECTION, SOLUTION INTRAVENOUS at 07:57

## 2023-11-13 RX ADMIN — PROPOFOL 20 MG: 10 INJECTION, EMULSION INTRAVENOUS at 09:04

## 2023-11-13 RX ADMIN — HYDROMORPHONE HYDROCHLORIDE 0.5 MG: 1 INJECTION, SOLUTION INTRAMUSCULAR; INTRAVENOUS; SUBCUTANEOUS at 09:30

## 2023-11-13 RX ADMIN — MIDAZOLAM 2 MG: 1 INJECTION INTRAMUSCULAR; INTRAVENOUS at 07:32

## 2023-11-13 RX ADMIN — FENTANYL CITRATE 50 MCG: 50 INJECTION, SOLUTION INTRAMUSCULAR; INTRAVENOUS at 08:22

## 2023-11-13 RX ADMIN — FENTANYL CITRATE 50 MCG: 50 INJECTION, SOLUTION INTRAMUSCULAR; INTRAVENOUS at 07:42

## 2023-11-13 RX ADMIN — LIDOCAINE HYDROCHLORIDE 40 MG: 20 INJECTION INTRAVENOUS at 07:38

## 2023-11-13 RX ADMIN — ONDANSETRON 4 MG: 2 INJECTION INTRAMUSCULAR; INTRAVENOUS at 09:01

## 2023-11-13 RX ADMIN — CEFAZOLIN SODIUM 2 G: 2 INJECTION, SOLUTION INTRAVENOUS at 07:38

## 2023-11-13 RX ADMIN — LABETALOL HYDROCHLORIDE 2.5 MG: 5 INJECTION INTRAVENOUS at 08:34

## 2023-11-13 RX ADMIN — PROPOFOL 20 MG: 10 INJECTION, EMULSION INTRAVENOUS at 07:55

## 2023-11-13 RX ADMIN — ROCURONIUM BROMIDE 20 MG: 10 INJECTION, SOLUTION INTRAVENOUS at 07:58

## 2023-11-13 RX ADMIN — FENTANYL CITRATE 50 MCG: 50 INJECTION, SOLUTION INTRAMUSCULAR; INTRAVENOUS at 07:53

## 2023-11-13 RX ADMIN — SODIUM CHLORIDE, POTASSIUM CHLORIDE, SODIUM LACTATE AND CALCIUM CHLORIDE 100 ML/HR: 600; 310; 30; 20 INJECTION, SOLUTION INTRAVENOUS at 07:00

## 2023-11-13 RX ADMIN — ROCURONIUM BROMIDE 50 MG: 10 INJECTION, SOLUTION INTRAVENOUS at 07:38

## 2023-11-13 RX ADMIN — ACETAMINOPHEN 650 MG: 325 TABLET ORAL at 11:20

## 2023-11-13 RX ADMIN — PROPOFOL 40 MG: 10 INJECTION, EMULSION INTRAVENOUS at 07:48

## 2023-11-13 RX ADMIN — HYDROMORPHONE HYDROCHLORIDE 0.5 MG: 1 INJECTION, SOLUTION INTRAMUSCULAR; INTRAVENOUS; SUBCUTANEOUS at 10:17

## 2023-11-13 RX ADMIN — ESMOLOL HYDROCHLORIDE 30 MG: 10 INJECTION, SOLUTION INTRAVENOUS at 07:49

## 2023-11-13 RX ADMIN — DEXAMETHASONE SODIUM PHOSPHATE 8 MG: 4 INJECTION, SOLUTION INTRAMUSCULAR; INTRAVENOUS at 07:53

## 2023-11-13 RX ADMIN — LABETALOL HYDROCHLORIDE 5 MG: 5 INJECTION INTRAVENOUS at 08:26

## 2023-11-13 RX ADMIN — FENTANYL CITRATE 50 MCG: 50 INJECTION, SOLUTION INTRAMUSCULAR; INTRAVENOUS at 07:38

## 2023-11-13 RX ADMIN — HYDROMORPHONE HYDROCHLORIDE 0.5 MG: 1 INJECTION, SOLUTION INTRAMUSCULAR; INTRAVENOUS; SUBCUTANEOUS at 09:50

## 2023-11-13 RX ADMIN — HYDROMORPHONE HYDROCHLORIDE 0.5 MG: 1 INJECTION, SOLUTION INTRAMUSCULAR; INTRAVENOUS; SUBCUTANEOUS at 10:40

## 2023-11-13 RX ADMIN — PROPOFOL 150 MG: 10 INJECTION, EMULSION INTRAVENOUS at 07:38

## 2023-11-13 RX ADMIN — PROPOFOL 50 MG: 10 INJECTION, EMULSION INTRAVENOUS at 07:42

## 2023-11-13 SDOH — HEALTH STABILITY: MENTAL HEALTH: CURRENT SMOKER: 0

## 2023-11-13 ASSESSMENT — PAIN - FUNCTIONAL ASSESSMENT
PAIN_FUNCTIONAL_ASSESSMENT: 0-10

## 2023-11-13 ASSESSMENT — PAIN SCALES - GENERAL
PAINLEVEL_OUTOF10: 7
PAINLEVEL_OUTOF10: 9
PAINLEVEL_OUTOF10: 10 - WORST POSSIBLE PAIN
PAINLEVEL_OUTOF10: 7
PAINLEVEL_OUTOF10: 9
PAINLEVEL_OUTOF10: 10 - WORST POSSIBLE PAIN
PAIN_LEVEL: 5
PAINLEVEL_OUTOF10: 7

## 2023-11-13 ASSESSMENT — PAIN DESCRIPTION - DESCRIPTORS
DESCRIPTORS: BURNING;SORE
DESCRIPTORS: ACHING

## 2023-11-13 ASSESSMENT — COLUMBIA-SUICIDE SEVERITY RATING SCALE - C-SSRS
1. IN THE PAST MONTH, HAVE YOU WISHED YOU WERE DEAD OR WISHED YOU COULD GO TO SLEEP AND NOT WAKE UP?: NO
2. HAVE YOU ACTUALLY HAD ANY THOUGHTS OF KILLING YOURSELF?: NO
6. HAVE YOU EVER DONE ANYTHING, STARTED TO DO ANYTHING, OR PREPARED TO DO ANYTHING TO END YOUR LIFE?: NO

## 2023-11-13 NOTE — BRIEF OP NOTE
Date: 2023  OR Location: [facility] OR    Name: Wanda Stearns, : 1970, Age: 53 y.o., MRN: [id number], Sex: female    Diagnosis  Pre-op Diagnosis     * Obstructive sleep apnea (adult) (pediatric) [G47.33] Post-op Diagnosis     * Obstructive sleep apnea (adult) (pediatric) [G47.33]     Procedures    * PALATOPHARYNGOPLASTY    Surgeons      * Santo Alvarenga - Primary    Resident/Fellow/Other Assistant:  Surgeon(s) and Role:    Procedure Summary  Anesthesia: General  ASA: II  Anesthesia Staff: Anesthesiologist: Todd Kulkarni MD  C-AA: CRYSTAL Rowe  Estimated Blood Loss: 5 mL  Intra-op Medications:   Medication Name Total Dose   sodium chloride 0.9 % irrigation solution 500 mL   ceFAZolin in dextrose (iso-os) (Ancef) IVPB  - Omnicell Override Pull Cannot be calculated   ceFAZolin in dextrose (iso-os) (Ancef) IVPB 2 g 2 g              Anesthesia Record               Intraprocedure I/O Totals          Intake    Propofol Drip 0.00 mL    The total shown is the total volume documented since Anesthesia Start was filed.    ceFAZolin in dextrose (iso-os) (Ancef) IVPB 2 g 100.00 mL    Total Intake 100 mL          Specimen:   ID Type Source Tests Collected by Time   1 : LEFT TONSIL Tissue TONSIL RESECTION LEFT SURGICAL PATHOLOGY EXAM Santo Alvarenga MD 2023 0906   2 : RIGHT TONSIL Tissue TONSIL RESECTION RIGHT SURGICAL PATHOLOGY EXAM Santo Alvarenga MD 2023 0908        Staff:   Circulator: Malu Osuna RN  Relief Circulator: Rosa Maria Novoa RN  Scrub Person: Toni Rossi RN      Findings: Small mouth with limited mouth opening, 2-3+ tonsils    Complications:  None; patient tolerated the procedure well.     Disposition: PACU - hemodynamically stable.  Condition: stable  Specimens Collected:   ID Type Source Tests Collected by Time   1 : LEFT TONSIL Tissue TONSIL RESECTION LEFT SURGICAL PATHOLOGY EXAM Santo Alvarenga MD 2023 0906   2 : RIGHT TONSIL Tissue  TONSIL RESECTION RIGHT SURGICAL PATHOLOGY EXAM Santo Alvarenga MD 11/13/2023 0908     Attending Attestation:     Santo Alvarenga  Phone Number: [phone]

## 2023-11-13 NOTE — OP NOTE
PALATOPHARYNGOPLASTY Operative Note     Date: 2023  OR Location: [facility] OR    Name: Wanda Stearns, : 1970, Age: 53 y.o., MRN: [id number], Sex: female    Diagnosis  Pre-op Diagnosis     * Obstructive sleep apnea (adult) (pediatric) [G47.33] Post-op Diagnosis     * Obstructive sleep apnea (adult) (pediatric) [G47.33]     Procedures    * PALATOPHARYNGOPLASTY    Surgeons      * Santo Alvarenga - Primary    Resident/Fellow/Other Assistant:  Surgeon(s) and Role:  Carolyn Miller MD    Procedure Summary  Anesthesia: General  ASA: II  Anesthesia Staff: Anesthesiologist: Todd Kulkarni MD  C-AA: CRYSTAL Rowe  Estimated Blood Loss: 5 mL  Intra-op Medications:   Medication Name Total Dose   sodium chloride 0.9 % irrigation solution 500 mL   HYDROmorphone (Dilaudid) injection 0.5 mg 1 mg   ceFAZolin in dextrose (iso-os) (Ancef) IVPB  - Omnicell Override Pull Cannot be calculated   ceFAZolin in dextrose (iso-os) (Ancef) IVPB 2 g 2 g              Anesthesia Record               Intraprocedure I/O Totals          Intake    LR 1000.00 mL    Propofol Drip 0.00 mL    The total shown is the total volume documented since Anesthesia Start was filed.    ceFAZolin in dextrose (iso-os) (Ancef) IVPB 2 g 100.00 mL    Total Intake 1100 mL          Specimen:   ID Type Source Tests Collected by Time   1 : LEFT TONSIL Tissue TONSIL RESECTION LEFT SURGICAL PATHOLOGY EXAM Santo Alvarenga MD 2023 0906   2 : RIGHT TONSIL Tissue TONSIL RESECTION RIGHT SURGICAL PATHOLOGY EXAM Santo Alvarenga MD 2023 0908        Staff:   Circulator: Malu Osuna RN  Relief Circulator: Rosa Maria Novoa RN  Scrub Person: Toni Rossi RN    Findings: Small mouth with limited mouth opening, 2 endophytic tonsils     Indications: Wanda Stearns is an 53 y.o. female who is having surgery for Obstructive sleep apnea (adult) (pediatric) [G47.33].     The patient was seen in the preoperative area. The risks, benefits,  complications, treatment options, non-operative alternatives, expected recovery and outcomes were discussed with the patient. The possibilities of reaction to medication, pulmonary aspiration, injury to surrounding structures, bleeding, recurrent infection, the need for additional procedures, failure to diagnose a condition, and creating a complication requiring transfusion or operation were discussed with the patient. The patient concurred with the proposed plan, giving informed consent.  The site of surgery was properly noted/marked if necessary per policy. The patient has been actively warmed in preoperative area. Preoperative antibiotics have been ordered and given within 1 hours of incision. Venous thrombosis prophylaxis have been ordered including bilateral sequential compression devices    Procedure Details:     Indications:  This is a 53 year old female who presents with obstructive sleep apnea and failure of conservative therapy. The decision was made to proceed to the OR for the above listed procedure after reviewing the risks/benefits/alternatives with the patient's guardian. Informed consent was obtained and placed in the chart.    Operative Details:  The patient was met in the preoperative area and at that time any further questions were answered and consent was obtained. The patient was escorted to the operating suite, transferred to the operating table in a supine position and placed under general endotracheal intubation anesthesia. A preoperative time out was performed, verifying the correct patient, surgical site, and procedure. The bed was turned 90 degrees. The upper face and eyes were covered with a surgical towel. The McIvor mouth gag was then used to retract the tongue and mandible. The soft palate was examined and did not have any evidence of submucosal cleft or bifid uvula.     The left tonsil was then removed in an extracapsular fashion using bovie electrocautery. Hemostasis was obtained  using bipolar electrocautery or suction bovie. The right tonsil was then removed in a similar fashion. The tonsillar fossas were examined and hemostasis was assured. Surgical sites were washed with saline and checked for possible bleeding sites. Using 2-3 3-0 vicryl sutures in a half vertical mattress fashion on each side, the anterior and posterior pillars were closed.    All instruments were removed. The patient was turned over to anesthesia and extubated, having tolerated the procedure well. The patient was then escorted to the post anesthesia care unit in stable condition.    Dr Corado was present for the entire procedure.    Complications:  None; patient tolerated the procedure well.    Disposition: PACU - hemodynamically stable.  Condition: stable     Attending Attestation:     Santo Alvarenga  Phone Number: [phone]

## 2023-11-13 NOTE — ANESTHESIA PROCEDURE NOTES
Airway  Date/Time: 11/13/2023 7:41 AM  Urgency: elective    Airway not difficult    Staffing  Performed: CRYSTAL   Authorized by: Todd Kulkarni MD    Performed by: CRYSTAL Rowe  Patient location during procedure: OR    Indications and Patient Condition  Indications for airway management: anesthesia and airway protection  Spontaneous ventilation: present  Sedation level: deep  Preoxygenated: yes  Patient position: sniffing  MILS maintained throughout  Mask difficulty assessment: 1 - vent by mask  Planned trial extubation    Final Airway Details  Final airway type: endotracheal airway      Successful airway: ETT  Cuffed: yes   Successful intubation technique: video laryngoscopy  Facilitating devices/methods: intubating stylet  Endotracheal tube insertion site: oral  Blade: Lilian  Blade size: #3  ETT size (mm): 7.0  Cormack-Lehane Classification: grade I - full view of glottis  Placement verified by: capnometry   Cuff volume (mL): 8  Measured from: lips  ETT to lips (cm): 21  Number of attempts at approach: 1    Additional Comments  Elective Glidescope size 3 utilized secondary to preoperative airway assessment. Pt noted to have small/restricted mouth opening. Pt induced, easy BMV, intubation attempt x 1 with Glidescope. Minimal force used to open mouth secondary to mouth opening restriction preoperatively. Easy intubation with glidesope size 3, pt noted to have anterior VC. Pt airway in preanesthetic condition.

## 2023-11-13 NOTE — DISCHARGE INSTRUCTIONS
Uvulopalatopharyngoplasty (UPPP or UP3) Post-op Care Instructions:    Recovery:  Recovery from anesthesia usually occurs over several hours.  Following the surgery, the patient is transported to the recovery room where they are observed for approximately 1-2 hours.  They are then transferred to the inpatient camacho, where they allowed to visit with their family and friends.  We usually request that the patient plan 10 days off of work or activities after this surgery.      Diet:  The patient is typically given clear liquids after being transferred from the recovery room.  The patient can then advance to a soft diet over the following day or two, depending upon whether nausea is present or not, and their degree of throat discomfort with swallowing.  Once home, we allow patients to slowly resume a normal diet over a period of a week, but we ask that patients avoid sharp/bulky/scratchy foods for two weeks.  It is extremely important that the patient hydrate very well during the first week after the surgery.  Any liquid is fine, and we request that the patient drink enough to cause urination every 2-4 hours.  Clear urine is an indication that the patient is drinking enough, dark-yellow urine generally indicates that the patient is becoming dehydrated.      Activity:  We typically have the patient maintain head elevation while in bed during the first week.  This reduces swelling and pain and helps better oxygenation for ARPIT patients. We suggest no heavy lifting or vigorous activity during the first two weeks.      Further Instructions/Expectations:    Pain:    The pain following  UPPP is significant, and is typically located in the throat, jaw, tongue, and ears (the ear and jaw pain are referred, and not indicative of an ear or jaw problem).  The pain can be managed with narcotic and over-the-counter pain medications.  There is a range of narcotic pain medications (1-2 pills every 4-6 hours) that can be given for varying  degrees of pain.  The patient can transition to from the narcotic to Tylenol as the pain subsides over the first week. If the patient is having trouble managing pain after surgery, please call your surgeon’s office for further instructions.    Nausea:    Nausea is occasionally present after a general anesthesia.  If present in the hospital, the nursing staff can administer anti-nausea medications.  If present after discharge, anti-nausea medications can be called in by your surgeon, or their office staff.    Fever:     Fever is very common after a UPPP, and the patient’s temperature usually ranges from 99-101degrees during the first week.  If there is a sustained fever of over 101, please contact your surgeon.    Swelling:   Occasionally the patient notes some mild swelling of the tongue.  This is normal, but if there is progressive visible swelling, please contact your surgeon.  Very commonly, the patient comments that the throat feels swollen.  This is a normal sensation after surgery, and is usually improved by keeping the head elevated.    Voice:    the patient’s voice is usually normal after surgery.  We do not place any restrictions on voicing after UPPP.    Bad breath:    Most patients after UPPP experience significant bad breath.  This usually subsides after a week or two.  You can brush your teeth after this procedure, just be gentle on the back molars.    Velopharyngeal incompetence:    Many patients experience velopharyngeal incompetence (VPI) during the first few weeks after a UPPP.  This takes the form of liquids entering the nasal cavity with swallowing.  This is due to the palate not closing normally against the back wall of the throat, but subsides as the palate heals.      Oozing:    Most patients will experience a small amount of oozing of blood from the throat off and on during the first week or two after surgery.  If there is liya bleeding from the mouth, coughing up of clots of blood, or  vomiting blood, please contact your surgeon.    Sutures:   The surgical site is closed with sutures that come out on their own over the first several weeks after surgery.  Most of these are swallowed without any ill-consequences.  These sutures do not need to be removed.    CPAP:    Depending on the severity of their sleep apnea, the surgeon may request that they use their CPAP the evening after the surgery, or during the first few weeks.

## 2023-11-13 NOTE — ANESTHESIA PREPROCEDURE EVALUATION
Patient: Wanda Stearns    Procedure Information       Date/Time: 11/13/23 0730    Procedure: PALATOPHARYNGOPLASTY    Location: [facility] / [facility]    Surgeons: Santo Alvarenga MD            Relevant Problems   Cardiovascular   (+) Benign essential hypertension      Endocrine   (+) Goiter   (+) Multinodular thyroid      Neuro/Psych   (+) BOB (generalized anxiety disorder)      Pulmonary   (+) Obstructive sleep apnea       Clinical information reviewed:    Allergies  Meds     OB Status           NPO Detail:  NPO/Void Status  Date of Last Liquid: 11/12/23  Time of Last Liquid: 2000  Date of Last Solid: 11/12/23  Time of Last Solid: 2000         Physical Exam    Airway  Mallampati: III  TM distance: <3 FB  Neck ROM: limited     Cardiovascular   Rhythm: regular  Rate: normal     Dental - normal exam     Pulmonary   Breath sounds clear to auscultation     Abdominal            Anesthesia Plan    ASA 2     general     The patient is not a current smoker.  Patient was not previously instructed to abstain from smoking on day of procedure.  Patient did not smoke on day of procedure.    intravenous induction   Postoperative administration of opioids is intended.  Anesthetic plan and risks discussed with patient.  Use of blood products discussed with patient who consented to blood products.

## 2023-11-13 NOTE — ANESTHESIA POSTPROCEDURE EVALUATION
Patient: Wanda Stearns    Procedure Summary       Date: 11/13/23 Room / Location: [facility] / [facility]    Anesthesia Start: 0729 Anesthesia Stop: 0932    Procedure: PALATOPHARYNGOPLASTY Diagnosis:       Obstructive sleep apnea (adult) (pediatric)      (Obstructive sleep apnea (adult) (pediatric) [G47.33])    Surgeons: Santo Alvarenga MD Responsible Provider: Todd Kulkarni MD    Anesthesia Type: general ASA Status: 2            Anesthesia Type: general    Vitals Value Taken Time   /98 11/13/23 0930   Temp 36.2 11/13/23 0932   Pulse 75 11/13/23 0930   Resp 16 11/13/23 0932   SpO2 100 % 11/13/23 0930   Vitals shown include unvalidated device data.    Anesthesia Post Evaluation    Patient location during evaluation: PACU  Patient participation: complete - patient participated  Level of consciousness: awake and alert  Pain score: 5  Pain management: adequate  Airway patency: patent  Cardiovascular status: acceptable  Respiratory status: acceptable  Hydration status: acceptable        There were no known notable events for this encounter.

## 2023-11-21 NOTE — PROGRESS NOTES
Post op visit sp UPPP on 11/13/2023    Patient returns today on day 7 of Preservation Palatoplasty with Palatoplexy. Patient reports sore throat still significant but has been able to take control with medication. Patient has taken postop medication correctly. Today exam shows scabbing on throat with adequate healing.  Patient is going to continue nasal saline rinses and return for remaining follow-ups.     Patient presents today for pre-op appointment to Uvulopalatopharyngoplasty     The role of palatoplasty to reduce ARPIT burden was discussed. Risks including post-op bleeding, post-op significant pain, changes in taste and continuous tongue pain, amongst others were discussed and all questions answered.     Patient expresses understanding and after considering risks, benefits, and alternatives, surgical management was elected.      Orders   · CT Facial Bones; Status:Hold For - Scheduling,Retrospective By Protocol Authorization;  Requested for:[id number];   Patient taking Metformin or Derivatives? : Unknown  Radiologist to Determine Optimal Study : Y  What are the patient's signs and symptoms? : ARPIT   · Pain Management Referral Evaluation and Treatment  Evaluate & Treat for post op pain  management due to pt prefers no narcotics after surgery UPPP due to recovering ETOH   Status: Hold For - Scheduling,Retrospective By Protocol Authorization  Requested for:  [id number]     Patient Discussion/Summary     This is a 53 year old female with a history of severe ARPIT and CPAP intolerance who presented to Dr. Corado for surgical options for ARPIT management. We discussed that given her anatomy, first line of treatment would include a UPPP. We counseled her that although we feel this procedure is clearly indicated, given her degree of apnea, we cannot guarantee that this will be entirely curative. She discussed with us her history of alcoholism and concern regarding postoperative pain control with narcotics after UPPP. She also  has an allergy to NSAIDS. We will refer her to pain management to assist with this. We have also ordered a CT face to evaluate her prior genioplasty to help determine if she could benefit from bony work at the same time. Will see her back once more before surgery.      Chief Complaint     New patient visit for sleep apnea, intolerance to PAP, large tonsils and narrow airway      History of Present IllnessMsMarcin LANE ,  1970, referred for an initial consultation with a long history of reported loud snoring, waking up feeling unrefreshed, excessive daytime fatigue. Crosby Sleepiness Scale Score is 16 /24. Fatigue Severity Scale Score is 43 /63. Snoring VAS 10/10     Sleep study histories: (personally reviewed raw data such as interpretation report, data sheet, hypnogram, and titration table)  The patient has been previously diagnosed with obstructive sleep apnea (ARPIT), in a home sleep test performed on 2023, that showed an Apnea Hypopnea Index (AHI) of 68.1 events per hour. The 3% oxygen desaturation index (RIK) was 66.1 events per hour. During that night, the lowest 02 saturation was 75 %, and the patient spent 60 minutes of total sleep time with 02 sat less than 88%. During supine sleep the AHI was 78.3 events per hour and non supine sleep was 35.7 events per hour     For nocturnal symptoms (without or prior to treatment), she endorses that there is  snoring, witnessed apneas, nocturia and restless sleep.     For daytime symptoms (without or prior to treatment), he endorses that there is morning headaches, irritability and depression.     Patient does reports nasal obstruction. It does fluctuate, and has been treated with nasal corticosteroids without significant improvements. He does not have a history of nasal surgery or upper airway surgery. He does not have a history of nasal trauma. He does not describe a history of facial pressure type of headaches. He does not report a history of nasal  "drainage, denies epistaxis.   NOSE- 8 / 20  Nasal Obstruction VAS- 1/10     Ms. Stearns presents with severe ARPIT, she is compliant but intolerant to CPAP. She feels fatigued throughout the day despite wearing the CPAP. She has a history of rhinoplasty and genioplasty in the past. Her mother has a \"congenital narrowing of the airway\" and she feels she has a similar condition. She denies any nasal obstruction, breathes well through her nose. She had braces as a child and still wears a retainer. She has not tried an oral appliance. She works at  in the [employer] department. Smokes 2-3 cigarettes per day.      Active Problems   · Abnormal mammogram (793.80) (R92.8)   · Angioedema of lips, initial encounter (995.1) (T78.3XXA)   · Benign essential hypertension (401.1) (I10)   · Encounter for screening mammogram for breast cancer (V76.12) (Z12.31)   · BOB (generalized anxiety disorder) (300.02) (F41.1)   · Goiter (240.9) (E04.9)   · Lower limb length difference (736.81) (M21.70)   · Menopause (627.2) (Z78.0)   · Multinodular thyroid (241.1) (E04.2)   · Obstructive sleep apnea (327.23) (G47.33)   · Other fatigue (780.79) (R53.83)   · Pain, neck (723.1) (M54.2)   · Psoriasis (696.1) (L40.9)     Past Medical History   · History of varicella (V12.09) (Z86.19)     Surgical History   · History of Repair Of Extraoral Mandible With Bone Plate   · History of Rhinoplasty     Family History   · Family history of hypertension (V17.49) (Z82.49)   · Family history of hypothyroidism (V18.19) (Z83.49)   · Family history of Seasonal allergic rhinitis, unspecified allergic rhinitis trigger   · Family history of Alcohol abuse, in remission   · Family history of Anxiety   · Family history of Diabetes mellitus of other type without complication   · Family history of depression (V17.0) (Z81.8)   · Family history of hypertension (V17.49) (Z82.49)   · Family history of Obsessive-compulsive behavior   · Family history of Seasonal allergic " rhinitis, unspecified allergic rhinitis trigger   · Family history of Anxiety   · Family history of depression (V17.0) (Z81.8)   · Family history of hemochromatosis (V18.19) (Z83.49)   · Family history of dementia (V17.2) (Z81.8)   · Family history of Alcohol abuse   · Family history of Alcohol abuse   · Family history of Anxiety   · Family history of depression (V17.0) (Z81.8)   · Family history of Anxiety   · Family history of depression (V17.0) (Z81.8)     Social History   · Alcoholism in recovery (303.93) (F10.21)   · Caffeine use (V49.89) (Z78.9)   · Current smoker on some days (305.1) (F17.200)   · Does not use illicit drugs (V49.89) (Z78.9)   · Denied: History of Drug use   · Feels safe at home   · No alcohol use     Allergies   · Ibuprofen TABS  Recorded By: Carolyn Hirsch; 12/22/2016 4:27:59 PM     Current Meds     Medication Name Instruction   Atenolol 25 MG Oral Tablet TAKE 1 TABLET DAILY.   EPINEPHrine 0.3 MG/0.3ML Injection Solution Auto-injector USE AS DIRECTED   Mometasone Furoate 0.1 % External Ointment APPLY THIN LAYER TO AFFECTED AREAS ON FACE TWICE A DAY X 7 DAYS, THEN DISCONTINUE.   Multi-Vitamin/Iron Oral Tablet TAKE 1 TABLET ONCE DAILY.   Tretinoin 0.05 % External Cream APPLY A PEA SIZED AMOUNT TO ENTIRE FACE EVERY NIGHT   Triamcinolone Acetonide 0.1 % External Cream APPLY TO AFFECTED AREA TWICE A DAY      Vitals  Vital Signs     Recorded: [id number] 11:10AM   Temperature 98.3 F   Weight 129 lb 9.6 oz   BMI Calculated 24.49 kg/m2   BSA Calculated 1.57   Tobacco Use a) Yes   PHQ-2  #1. Over the last 2 weeks have you felt down, depressed or hopeless?  (If yes, answer PHQ-9 below) Yes   PHQ-2  #2. Over the last 2 weeks have you felt little interest  or pleasure in doing things?  (If yes, answer PHQ-9 below) Yes   Falls Screening (Age 18+) a) No falls within the last year      Physical Exam  GENERAL: Well-developed, well-nourished.      EYES: Ocular motility intact.      EARS: Otoscopy of external  auditory canals and tympanic membranes is normal with clinical speech reception thresholds grossly intact. No mass/lesion of auricle.      NOSE:   Anterior rhinoscopy shows there is left septal deviation and bilateral inferior turbinate hypertrophy.  Right Turbinate: 2  Left Turbinate: 2      NECK: No cervical lymphadenopathy, no neck mass/crepitus/ asymmetry, trachea is midline, no thyroid enlargement/tenderness/mass.      OROPHARYNX:   Tonsil size: 3+  Modified Mallampatti tongue position: 2  Tongue position is Freidman 2  Scalloping is noted.   Soft Palate: is midly high arched     MAXILLOFACIAL:   On frontal repose, patient shows symmetrical facial thirds with retrusion of maxilla. Symmetrical facial fifths.  There is not paranasal flattening.  There is not deep nasolabial folds.        DENTAL:  The occlusal plane is not steep. Overjet and Overbite are adequate.   Right - Class 2 canine, Class 2 molar  Left - Class 2 canine, Class 2 molar  There is transverse discrepancy with narrow maxilla.  The maxilla is highly-arched.   Tongue shows teeth indentations.   Dentition: fair     NEURO/PSYCH: Cranial nerves grossly intact, oriented x3 (time, place, person), appropriate mood and affect.      RESPIRATION: Symmetric expansion during respiration, normal respiratory effort.      CARDIOVASCULAR: Pulse is regular rhythm and rate      Procedure: Diagnostic Nasal/ Pharyngeal Endoscopy     Indication for procedure: To evaluate static and dynamic upper anatomy not visible by anterior rhinoscopy and oral cavitiy examination for anatomic risk factors of obstructive sleep apnea. Also to evaluate extension of polyposis.      Anesthesia: 1% phenylephrine,4% lidocaine topical spray     Description:   A flexible endoscope was used to examine the left and right nasal cavities. The nasal valve areas were examined for abnormalities or collapse. The inferior turbinates are significantly hypertrophied. Several polyps in both meatus are  noted with drainage of thick mucus from middle and superior meatuses. The scope was then passed through the nasopharyngeal, oropharyngeal, and hypopharyngeal airway. The Aparicio Maneuver was performed with the patient in sitting position.Collapse was assessed during a maximal inspiratory effort against a closed mouth and sealed nose. The patient tolerated the procedure without complications and was returned to ambulatory status.      Nasopharynx: There is not adenoid hypertrophy.   Oropharynx: There is severe palatine tonsillar hypertrophy.   Hypopharynx: There is moderate lingual tonsillar hypertrophy, with lingual tonsils of Grade 1. Vocal Cord position with Grade 1 view. With Mehta maneuver, base of tongue collapse is grade 1.            'Scores and Scales'  -Sleep QOL Set     Goal [id number]   NOSE (20) TS       Snoring VAS TS       ESS (24) TS   11   GLENDA (28) TS   22   FOSQ (40) TS   26

## 2023-11-22 ENCOUNTER — OFFICE VISIT (OUTPATIENT)
Dept: OTOLARYNGOLOGY | Facility: CLINIC | Age: 53
End: 2023-11-22
Payer: COMMERCIAL

## 2023-11-22 VITALS — WEIGHT: 128.4 LBS | BODY MASS INDEX: 24.26 KG/M2 | TEMPERATURE: 98.5 F

## 2023-11-22 DIAGNOSIS — G47.33 OBSTRUCTIVE SLEEP APNEA: ICD-10-CM

## 2023-11-22 PROCEDURE — 99214 OFFICE O/P EST MOD 30 MIN: CPT

## 2023-11-22 ASSESSMENT — PATIENT HEALTH QUESTIONNAIRE - PHQ9: 2. FEELING DOWN, DEPRESSED OR HOPELESS: NOT AT ALL

## 2023-11-22 NOTE — LETTER
November 22, 2023     Patient: Wanda Stearns   YOB: 1970   Date of Visit: 11/22/2023       To Whom It May Concern:    Wanda Stearns was seen in my clinic on 11/22/2023 at 11:30 am. Please excuse Wanda for her absence from work since 11/13/2023 so she could recover from surgery. Wanda is cleared to go back to work on 11/27/2023 without restrictions.    If you have any questions or concerns, please don't hesitate to call.         Sincerely,         Santo Alvarenga MD        CC:   No Recipients

## 2023-11-25 ENCOUNTER — HOSPITAL ENCOUNTER (OUTPATIENT)
Dept: CARDIOLOGY | Facility: HOSPITAL | Age: 53
Discharge: HOME | End: 2023-11-25
Payer: COMMERCIAL

## 2023-11-25 LAB
ATRIAL RATE: 66 BPM
P AXIS: 57 DEGREES
P OFFSET: 193 MS
P ONSET: 142 MS
PR INTERVAL: 154 MS
Q ONSET: 219 MS
QRS COUNT: 11 BEATS
QRS DURATION: 84 MS
QT INTERVAL: 404 MS
QTC CALCULATION(BAZETT): 423 MS
QTC FREDERICIA: 417 MS
R AXIS: 14 DEGREES
T AXIS: 36 DEGREES
T OFFSET: 421 MS
VENTRICULAR RATE: 66 BPM

## 2023-11-25 PROCEDURE — 93005 ELECTROCARDIOGRAM TRACING: CPT

## 2023-11-27 LAB
LABORATORY COMMENT REPORT: NORMAL
PATH REPORT.FINAL DX SPEC: NORMAL
PATH REPORT.GROSS SPEC: NORMAL
PATH REPORT.RELEVANT HX SPEC: NORMAL
PATH REPORT.TOTAL CANCER: NORMAL

## 2023-12-23 ENCOUNTER — DOCUMENTATION (OUTPATIENT)
Dept: PRIMARY CARE | Facility: CLINIC | Age: 53
End: 2023-12-23
Payer: COMMERCIAL

## 2023-12-23 DIAGNOSIS — M54.2 NECK PAIN: Primary | ICD-10-CM

## 2023-12-23 NOTE — PROGRESS NOTES
Patient reached out due to chronic cervical pain.  Referral is placed.    Symptoms will be re assessed in follow-up

## 2024-01-02 ENCOUNTER — OFFICE VISIT (OUTPATIENT)
Dept: ORTHOPEDIC SURGERY | Facility: CLINIC | Age: 54
End: 2024-01-02
Payer: COMMERCIAL

## 2024-01-02 DIAGNOSIS — M54.2 PAIN, NECK: Primary | ICD-10-CM

## 2024-01-02 DIAGNOSIS — M54.2 NECK PAIN: ICD-10-CM

## 2024-01-02 PROCEDURE — 99204 OFFICE O/P NEW MOD 45 MIN: CPT | Performed by: PHYSICIAN ASSISTANT

## 2024-01-02 NOTE — PROGRESS NOTES
Wanda is a 53-year-old female reporting to clinic today for evaluation of her chronic neck pain.    Her symptoms have been progressively worsening over the last 6 months.  She has constant neck pain.  She has pain radiating into the back of her head.  She has noticed a decrease in her cervical range of motion.  She has had increased difficulty with balance recently.  She denies pain rating down her arms.  She denies recent changes in her fine motor skills, no change in handwriting.  No difficulty with buttons or zippers.  She has full control of her bowel and bladder.    She attributes her neck pain to a leg length discrepancy that she has had since she was 6 years old.  She recently started wearing custom inserts.  Her symptoms seem to have worsened since wearing these inserts.    Family, social, and medical histories are obtained and reviewed.    ROS: All other systems have been reviewed and are negative except as previously noted in history of present illness.    Const: Well-appearing, well-nourished female in no distress.  Eyes: Normal appearing sclera and conjunctiva, no jaundice, pupils normal in appearance.  Neck: No masses or lymphadenopathy appreciated.  Resp: breathing comfortably, normal respiratory rate rate.  CV: No upper or lower extremity edema.  Musculoskeletal: Normal gait.  Able to heel/toe walk without difficulty.  Cervical ROM is supple.  Strength exam of the upper extremities reveals 5/5 strength in all major muscle groups.  No intrinsic wasting.  Negative Spurling sign.    Neuro: Sensation is intact and equal bilaterally. Deep tendon reflexes are normal and symmetric.  No clonus, negative Corrales sign, negative Lhermitte's sign  Skin: Intact without any lesions, normal turgor.  Psych: Alert and oriented x3, normal mood and affect.    A recent CT scan of the facial bones shows evidence of degenerative changes of the upper cervical spine.  Is not dedicated spine imaging so the views are  limited.    The plan is to further evaluate her 6 months of chronic neck pain with radiation into her head that has failed to improve with physical therapy.  A MRI of the cervical spine was ordered today.  She has noticed recent change in her balance which could be caused by pressure on the spinal cord.  My suspicion for this is low but it cannot be ruled out without an MRI.  She will follow-up with me once the MRI is complete.    **This note was dictated using speech recognition software and was not corrected for spelling or grammatical errors**

## 2024-01-19 ENCOUNTER — APPOINTMENT (OUTPATIENT)
Dept: RADIOLOGY | Facility: HOSPITAL | Age: 54
End: 2024-01-19
Payer: COMMERCIAL

## 2024-01-30 ENCOUNTER — DOCUMENTATION (OUTPATIENT)
Dept: CARE COORDINATION | Facility: CLINIC | Age: 54
End: 2024-01-30
Payer: COMMERCIAL

## 2024-02-02 ENCOUNTER — PATIENT OUTREACH (OUTPATIENT)
Dept: CARE COORDINATION | Facility: CLINIC | Age: 54
End: 2024-02-02
Payer: COMMERCIAL

## 2024-02-02 NOTE — PROGRESS NOTES
Referral received for care management services.  Outreach call to introduce self, available services, and assess needs.  Voicemail message left with my contact information.   Priscila Walden  [facility]    Contact: [phone]

## 2024-02-09 ENCOUNTER — HOSPITAL ENCOUNTER (OUTPATIENT)
Dept: RADIOLOGY | Facility: HOSPITAL | Age: 54
Discharge: HOME | End: 2024-02-09
Payer: COMMERCIAL

## 2024-02-09 DIAGNOSIS — M54.2 PAIN, NECK: ICD-10-CM

## 2024-02-09 PROCEDURE — 72141 MRI NECK SPINE W/O DYE: CPT | Performed by: STUDENT IN AN ORGANIZED HEALTH CARE EDUCATION/TRAINING PROGRAM

## 2024-02-09 PROCEDURE — 72141 MRI NECK SPINE W/O DYE: CPT

## 2024-02-22 ENCOUNTER — APPOINTMENT (OUTPATIENT)
Dept: ORTHOPEDIC SURGERY | Facility: HOSPITAL | Age: 54
End: 2024-02-22
Payer: COMMERCIAL

## 2024-02-28 ENCOUNTER — APPOINTMENT (OUTPATIENT)
Dept: ORTHOPEDIC SURGERY | Facility: HOSPITAL | Age: 54
End: 2024-02-28
Payer: COMMERCIAL

## 2024-02-29 ENCOUNTER — OFFICE VISIT (OUTPATIENT)
Dept: ORTHOPEDIC SURGERY | Facility: HOSPITAL | Age: 54
End: 2024-02-29
Payer: COMMERCIAL

## 2024-02-29 ENCOUNTER — APPOINTMENT (OUTPATIENT)
Dept: ORTHOPEDIC SURGERY | Facility: HOSPITAL | Age: 54
End: 2024-02-29
Payer: COMMERCIAL

## 2024-02-29 VITALS — BODY MASS INDEX: 24.17 KG/M2 | HEIGHT: 61 IN | WEIGHT: 128 LBS

## 2024-02-29 DIAGNOSIS — M79.18 CERVICAL MYOFASCIAL PAIN SYNDROME: Primary | ICD-10-CM

## 2024-02-29 PROCEDURE — 99214 OFFICE O/P EST MOD 30 MIN: CPT | Performed by: ORTHOPAEDIC SURGERY

## 2024-02-29 NOTE — PROGRESS NOTES
Chief Complaint: Left sided neck pain    HPI: Wanda Stearns is a 54 y.o. 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 She reports that her neck pain started last year, around the time that she started wearing a left shoe insert. She has a leg length discrepancy from a childhood left femur fracture.  Pain is along the left cervical paraspinal and trapezius does not radiate down the arm.  Has no radicular arm pain numbness tingling or paresthesias.  She has not tried any oral steroids or injections. She has trialed PT for 2 months without improvement in her pain. She is having trouble sleeping and has tried different pillows. Her symptoms are entirely left sided. She has no numbness or tingling, weakness, or bowel/bladder dysfunction. She denies fine motor issues or gait instability. She takes turmeric as her only anti-inflammatory. She doesn't take NSAIDs, tylenol (due to alcoholism history), or gabapentin. Nondiabetic.      Denies anticoagulations  Intermittent Tobacco use, not a daily smoker    Review of Systems    All other systems have been reviewed and are negative for complaint. All pertinent positive and negative as listed in history of present illness.    Past Medical History:   Diagnosis Date    Personal history of other infectious and parasitic diseases     History of varicella        Past Surgical History:   Procedure Laterality Date    OTHER SURGICAL HISTORY  12/13/2017    Repair Of Extraoral Mandible With Bone Plate    RHINOPLASTY  12/22/2016    Rhinoplasty        Allergies   Allergen Reactions    Ace Inhibitors Angioedema    Ibuprofen Angioedema        Current Outpatient Medications on File Prior to Visit   Medication Sig Dispense Refill    atenolol (Tenormin) 25 mg tablet Take 1 tablet (25 mg) by mouth once daily.      EPINEPHrine 0.3 mg/0.3 mL injection syringe Inject into the muscle 1 time if needed for anaphylaxis. Inject into upper leg. Call 911 after use.       multivitamin tablet Take 1 tablet by mouth once daily.      NON FORMULARY Collagen with Peptides      ondansetron (Zofran) 4 mg/5 mL solution Take 10 mL (8 mg) by mouth every 8 hours if needed for nausea or vomiting for up to 5 doses. 150 mL 0    tretinoin-hyaluronate-niacin 0.05-0.5-4 % cream Apply topically once daily at bedtime.      triamcinolone (Kenalog) 0.1 % cream Apply topically 2 times a day.      chlorthalidone (Hygroton) 25 mg tablet Take 1 tablet (25 mg) by mouth once daily. 30 tablet 1     No current facility-administered medications on file prior to visit.            PE:   General: Patient appears well-nourished and well-developed in no acute distress, Alert and Oriented x3  Psych: Pleasant mood and affect  HEENT: Extraocular muscles intact, pupils equal and round. Sclerae anicteric   Cardio: extremities warm and well perfused  Resp: unlabored symmetric breathing  Skin: no open wounds or rash  Musculoskeletal/Neuro Exam: Normal gait. Mild tenderness to palpation along the left paraspinal cervical region. Good Cervical range of motion. Neg Spurlings. Neg Lhermitte's Sign    Upper extremity:    Motor: Right upper extremity was 5 out of 5 strength with shoulder abduction, elbow flexion and extension, wrist flexion and extension and  against resistance  Left upper extremity with 5 out of 5 strength with shoulder abduction, elbow flexion and extension, wrist flexion and extension and  against resistance    Sensation to light touch intact along C5-T1 distribution bilaterally        Lower extremity    Motor: Right leg with 5 out of 5 motor strength with hip flexion, knee extension, ankle dorsiflexion plantarflexion EHL against resistance  Left leg with 5 out of 5 motor strength with hip flexion, knee extension, ankle dorsiflexion plantarflexion EHL against resistance    Sensation to light touch intact along L2-S1 distribution bilaterally              Imaging:    I personally reviewed MRI of the C  spine obtained on 1/2/24 which demonstrates mild multilevel cervical degenerative changes without signs of cord compression or significant stenosis at least on the sagittal view.  There was a lot of hardware artifact from hardware in her mandible and therefore unable to visualize the T2 axial views. Mild C5 foraminal narrowing bilaterally. Study limited due to metal artifact on axial cuts.           A/P: Wanda Stearns is a 54 y.o. year old female patient with left sided neck pain unresponsive to physical therapy for the past year. Her symptoms do not appear radicular in nature since she is not having radiation or paresthesias.  Likely more myofascial pain.  I recommended other conservative management methods including dry needling, massage therapy, and possibly trigger point injections with PMNR and smoking cessation. She is amenable to the plan and voiced understanding. Her questions were all answered to her satisfaction.  She was interested in massage therapy as well as trigger point injections.  She was given a referral to see Dr Sharp for trigger point injections and referral for massage therapy.  Patient can otherwise follow-up as needed. After our discussion, the patient articulated understanding of the plan and felt that all questions had been answered satisfactorily. The patient was pleased with the visit and very appreciative for the care rendered.    **Please excuse any errors in grammar or translation related to this dictation. Voice recognition software was utilized to prepare this document. **              May Galvin MD    Department of Orthopaedic Surgery  [facility]  [email or web address]

## 2024-03-04 ENCOUNTER — CLINICAL SUPPORT (OUTPATIENT)
Dept: SLEEP MEDICINE | Facility: CLINIC | Age: 54
End: 2024-03-04
Payer: COMMERCIAL

## 2024-03-04 DIAGNOSIS — G47.33 OBSTRUCTIVE SLEEP APNEA: ICD-10-CM

## 2024-03-04 PROCEDURE — 95806 SLEEP STUDY UNATT&RESP EFFT: CPT | Performed by: PSYCHIATRY & NEUROLOGY

## 2024-03-05 NOTE — PROGRESS NOTES
Type of Study: HOME SLEEP STUDY - NOMAD     The patient received equipment and instructions for use of the [employer] [facility] Nomad HSAT device. The patient was instructed how to apply the effort belts, cannula, thermistor. It was also explained how the Nomad and oximeter components work.  The patient was asked to record their sleep for an 8-hour period.     The patient was informed of their responsibility for the device and acknowledged this by signing the HSAT device contract. The patient was asked to return the device on 3-4-24 after 7 p.m. to the [facility].     The patient was instructed to call 911 as usual for any medical- emergencies while at home.  The patient was also given a phone number for troubleshooting when using the device in case there were additional questions.

## 2024-03-08 ASSESSMENT — SLEEP AND FATIGUE QUESTIONNAIRES
HOW LIKELY ARE YOU TO NOD OFF OR FALL ASLEEP WHILE SITTING AND READING: MODERATE CHANCE OF DOZING
HOW LIKELY ARE YOU TO NOD OFF OR FALL ASLEEP IN A CAR, WHILE STOPPED FOR A FEW MINUTES IN TRAFFIC: SLIGHT CHANCE OF DOZING
HOW LIKELY ARE YOU TO NOD OFF OR FALL ASLEEP WHILE LYING DOWN TO REST IN THE AFTERNOON WHEN CIRCUMSTANCES PERMIT: MODERATE CHANCE OF DOZING
HOW LIKELY ARE YOU TO NOD OFF OR FALL ASLEEP WHILE SITTING QUIETLY AFTER LUNCH WITHOUT ALCOHOL: SLIGHT CHANCE OF DOZING
ESS-CHAD TOTAL SCORE: 13
SITING INACTIVE IN A PUBLIC PLACE LIKE A CLASS ROOM OR A MOVIE THEATER: MODERATE CHANCE OF DOZING
HOW LIKELY ARE YOU TO NOD OFF OR FALL ASLEEP WHILE SITTING AND TALKING TO SOMEONE: SLIGHT CHANCE OF DOZING
HOW LIKELY ARE YOU TO NOD OFF OR FALL ASLEEP WHILE WATCHING TV: MODERATE CHANCE OF DOZING
HOW LIKELY ARE YOU TO NOD OFF OR FALL ASLEEP WHEN YOU ARE A PASSENGER IN A CAR FOR AN HOUR WITHOUT A BREAK: MODERATE CHANCE OF DOZING

## 2024-03-12 ENCOUNTER — CONSULT (OUTPATIENT)
Dept: ORTHOPEDIC SURGERY | Facility: CLINIC | Age: 54
End: 2024-03-12
Payer: COMMERCIAL

## 2024-03-12 DIAGNOSIS — M79.18 CERVICAL MYOFASCIAL PAIN SYNDROME: ICD-10-CM

## 2024-03-12 DIAGNOSIS — M47.812 CERVICAL SPONDYLOSIS: Primary | ICD-10-CM

## 2024-03-12 PROCEDURE — 20553 NJX 1/MLT TRIGGER POINTS 3/>: CPT | Performed by: PHYSICAL MEDICINE & REHABILITATION

## 2024-03-12 PROCEDURE — 99203 OFFICE O/P NEW LOW 30 MIN: CPT | Performed by: PHYSICAL MEDICINE & REHABILITATION

## 2024-03-12 SDOH — SOCIAL STABILITY: SOCIAL NETWORK: SOCIAL ACTIVITY:: 3

## 2024-03-12 ASSESSMENT — PAIN SCALES - GENERAL: PAINLEVEL_OUTOF10: 8

## 2024-03-12 ASSESSMENT — PAIN - FUNCTIONAL ASSESSMENT: PAIN_FUNCTIONAL_ASSESSMENT: 0-10

## 2024-03-12 ASSESSMENT — PAIN DESCRIPTION - DESCRIPTORS: DESCRIPTORS: ACHING

## 2024-03-12 NOTE — PROGRESS NOTES
New Consult/New Patient Note    3/12/2024   Referred by Dr. 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  Denies any significant radicular myelopathic symptoms.  - cervicalgia/myofascial pain  -Possible component of cervical spondylosis with facet arthropathy      PLAN:  1)  Imaging/Diagnostic Studies: Extensive reviewed and interpreted her cervical MRI-significant artifact on the axial cuts however there does not appear to be any severe central canal stenosis.  Possible bilateral foraminal narrowing at C4-5 and C5-6-Worse on the right.  Mild facet arthropathy.  2)  Therapy/Rehabilitation: Completed physical therapy-encouraged her to continue her home exercise program  3)  Pharmacological Management: Poor response to muscle relaxers in the past.  Educated in over-the-counter Tylenol.  She is allergic to ibuprofen  4)  Spine/Surgical Interventions: Trigger point injections performed today.  Can consider cervical medial branch blocks  5)  Alternative Treatments: May consider alternative treatment options in the future including manipulation (chiropractor versus osteopathic) and/or acupuncture if patient does not obtain optimal relief with initial treatment plan.  6)  Consultations:  None at this time  7)  Follow -up: 4-6 weeks or PRN if symptoms worsen/do not improve.   8)  Future treatment considerations: Repeat trigger point injections.  Cervical medial branch blocks    Procedure: Trigger point injection(s) performed:  The risks, benefits and anticipated outcomes of the procedure, the risks and benefits of the alternatives to the procedure, and the roles and tasks of the personnel to be involved, were discussed with the patient, and the patient consents to the procedure and agrees to proceed.  UNIVERSAL PROTOCOL / SAFETY CHECKLIST  Sign in Communication: Completed  Time Out:  Team Confirms the Correct Patient, Correct Procedure,  Correct Site and Site Marking, Correct Position (if applicable), Prep and Dry Time (if applicable).    Affirmation of Time Out: YES  Sign Out Discussion: Completed if applicable  Under sterile prep, a 25 ga needle was introduced and advanced  into 5 separate areas along the left cervical paraspinals, scalenes and upper trapezius. Following negative aspiration, a mixture of 1 cc Depomedrol 40mg/ml, 2 cc of 4 cc of 1% lidocaine was injected.  Approximately 1. cc was injected into each area and the remainder wasted.   The patient tolerated the procedure without complication, and was educated on post-injection instructions.  Lot numbers and expiration dates were recorded separately.    Patient ID: Wanda Stearns is a 54 y.o. female.    Inject Trigger Points, >3    Date/Time: 3/12/2024 4:42 PM    Performed by: Luz Sharp MD  Authorized by: Luz Sharp MD  Local anesthesia used: yes  Anesthesia: local infiltration    Anesthesia:  Local anesthesia used: yes  Local Anesthetic: lidocaine 1% without epinephrine  Anesthetic total: 4 mL    Sedation:  Patient sedated: no    Patient tolerance: patient tolerated the procedure well with no immediate complications  Comments: 40 mg Depomedrol also used.            Patient advised of the difference between hurt and harm and advised to continue with all normal activities and exercises. Patient verbalized understanding of the above plan and was happy with the care provided.      The above clinical summary has been dictated with voice recognition software. It has not been proofread for grammatical errors, typographical mistakes, or other semantic inconsistencies.    Thank you for visiting our office today. It was our pleasure to take part in your healthcare.     Do not hesitate to call with any questions regarding your plan of care after leaving at [phone]    To clinicians, thank you very much for this kind referral. It is a privilege to partner with you in the  care of your patients. My office would be delighted to assist you with any further consultations or with questions regarding the plan of care outlined. Do not hesitate to call the office or contact me directly.     Sincerely,    JAZZMINE Sharp MD  , Physical Medicine and Rehabilitation, Orthopedic Spine  [facility]  [facility]         Wanda Stearns   is a 54 y.o. female who presents with chronic left upper trapezius and mid to lower cervical area.    Location: as above  Radiation:  no sig. Radicular pain  Quality: achy    current 5/10,  at its worst 8-9/10  Exacerbated by sleeping and at night.    Relieved by Tylenol   Onset, traumatic event: no recent  Has tried:  PT, dry needling, HEP    Valsalva sign is neg  Smoker:  limited  Does wake them at night  Litigation: none    Patient denies bowel/bladder incontinence, denies fever, denies unintentional weight loss, denies clumsiness of hands, feet, or dropping things.  Denies any constitutional or myelopathic symptomatology.      PREVIOUS TREATMENTS  IN THE LAST SIX MONTHS     Active conservative therapy  in the last six months (see below)              1. Physical therapy:  yes                                                                                   2. Home exercise program after PT:  yes                                                    3. A physician supervised home exercise program (HEP):  yes               4. Chiropractic Care:  no                                                                    Passive conservative therapy  in the last six months (see below)              1. NSAIDS:    Allergy                                                                                                       2. Prescription pain medication:   muscle relaxer                                                           3. Acupuncture:  no                                                                                           4. Tens unit: no     Assistive Devices: no    Work status: RN at       ROS: Other than listed in HPI, PMHX below, and intake paperwork including a 30 point patient-recorded review of symptoms which was personally reviewed and inclusive of no history of unintentional weight loss, change in appetite, significant malaise, fevers, chills, or change in bowel/bladder, shortness of breath, or chest pain.    I have confirmed and edited as necessary Past Medical, Past Surgical, Family, Social History and ROS as obtained by others. These were also obtained on new patient forms.      PHYSICAL EXAM:   GENERAL APPEARANCE:  Well nourished, well developed, and no apparent distress.  NEURO PSYCH: Patient oriented to person, place, Mood pleasant. Benign affect.  MUSCULOSKELETAL and NEUROLOGICAL       VISUAL INSPECTION          CERVICAL: WNL  SPINE ROM:   CERVICAL ROM: Very limited diffusely      PALPATION:           SPINOUS PROCESS: Nontender midline           PARASPINALS: Tender throughout the left cervical paraspinals and upper trapezius and scalenes  FACET LOADING: Positive left cervical  MUSCLE BULK: Normal and symmetrical in the upper & lower extremities.  MUSCLE TONE: Mild hypertonicity appreciated in the left cervical and periscapular muscles  MOTOR: Functionally full in the bilateral upper extremities  GAIT: Normal.  No sig. balance deficit appreciated  PERIPHERAL JOINT ROM:   SHOULDER ROM: Full bilaterally with no significant pain  SPURLING'S TEST: Negative, only axial pain  BAKODY'S SIGN:  No sig. pain with overhead activity    DATA REVIEW:   The below imaging studies were personally reviewed and discussed with the patient.    Medical Decision Making:  The above note constitutes a Moderate to High level of medical decision making based on past data and imaging review, new and chronic symptoms with exacerbation, change in weakness or sensation, new imaging and diagnostic studies  ordered, discussion of potential interventional or surgical treatment options, acute or chronic pain that may pose a threat to bodily function.    Past Medical History:   Diagnosis Date    Personal history of other infectious and parasitic diseases     History of varicella       Medication Documentation Review Audit       Reviewed by Alexandra Oneill MA (Medical Assistant) on 24 at 1020      Medication Order Taking? Sig Documenting Provider Last Dose Status   atenolol (Tenormin) 25 mg tablet [id number] Yes Take 1 tablet (25 mg) by mouth once daily. Historical Provider, MD Taking Active   chlorthalidone (Hygroton) 25 mg tablet [id number]  Take 1 tablet (25 mg) by mouth once daily. Briseida White MD   10/14/23 1639   EPINEPHrine 0.3 mg/0.3 mL injection syringe [id number] Yes Inject into the muscle 1 time if needed for anaphylaxis. Inject into upper leg. Call 911 after use. Historical Provider, MD Taking Active   multivitamin tablet [id number] Yes Take 1 tablet by mouth once daily. Historical Provider, MD Taking Active   NON FORMULARY [id number] Yes Collagen with Peptides Historical Provider, MD Taking Active   ondansetron (Zofran) 4 mg/5 mL solution [id number] Yes Take 10 mL (8 mg) by mouth every 8 hours if needed for nausea or vomiting for up to 5 doses. Christie Avendano MD Taking Active   tretinoin-hyaluronate-niacin 0.05-0.5-4 % cream [id number] Yes Apply topically once daily at bedtime. Historical Provider, MD Taking Active   triamcinolone (Kenalog) 0.1 % cream [id number] Yes Apply topically 2 times a day. Historical Provider, MD Taking Active                    Allergies   Allergen Reactions    Ace Inhibitors Angioedema    Ibuprofen Angioedema       Social History     Socioeconomic History    Marital status:      Spouse name: Not on file    Number of children: Not on file    Years of education: Not on file    Highest education level: Not on file   Occupational History    Not on file   Tobacco Use     Smoking status: Former     Packs/day: .25     Types: Cigarettes     Start date:      Quit date:      Years since quittin.1    Smokeless tobacco: Not on file   Substance and Sexual Activity    Alcohol use: Never    Drug use: Not on file    Sexual activity: Not on file   Other Topics Concern    Not on file   Social History Narrative    Not on file     Social Determinants of Health     Financial Resource Strain: Not on file   Food Insecurity: Not on file   Transportation Needs: Not on file   Physical Activity: Not on file   Stress: Not on file   Social Connections: Not on file   Intimate Partner Violence: Not on file   Housing Stability: Not on file       Past Surgical History:   Procedure Laterality Date    OTHER SURGICAL HISTORY  2017    Repair Of Extraoral Mandible With Bone Plate    RHINOPLASTY  2016    Rhinoplasty

## 2024-03-21 ENCOUNTER — APPOINTMENT (OUTPATIENT)
Dept: PRIMARY CARE | Facility: CLINIC | Age: 54
End: 2024-03-21
Payer: COMMERCIAL

## 2024-03-26 NOTE — PROGRESS NOTES
3/29/2024 Follow up visit    Post op visit sp UPPP on 11/13/2023    Patient new sleep study shows AHI 3% 13.6 previous AHI was 66.1. New AHI 4% 8.8                                           Only 3 minutes bellow 88% saturation previous previous 60 minutes of total sleep time with 02 sat less than 88%                                             Patient reports significantly better sleep and waking up felling refreshened.     By all standards patient surgery was a success (Sheer and modified Sheer criteria) and patient is discharged.        Patient returns today on day 7 of Preservation Palatoplasty with Palatoplexy. Patient reports sore throat still significant but has been able to take control with medication. Patient has taken postop medication correctly. Today exam shows scabbing on throat with adequate healing.  Patient is going to continue nasal saline rinses and return for remaining follow-ups.      Patient presents today for pre-op appointment to Uvulopalatopharyngoplasty     The role of palatoplasty to reduce ARPIT burden was discussed. Risks including post-op bleeding, post-op significant pain, changes in taste and continuous tongue pain, amongst others were discussed and all questions answered.     Patient expresses understanding and after considering risks, benefits, and alternatives, surgical management was elected.      Orders   · CT Facial Bones; Status:Hold For - Scheduling,Retrospective By Protocol Authorization;  Requested for:[id number];   Patient taking Metformin or Derivatives? : Unknown  Radiologist to Determine Optimal Study : Y  What are the patient's signs and symptoms? : ARPIT   · Pain Management Referral Evaluation and Treatment  Evaluate & Treat for post op pain  management due to pt prefers no narcotics after surgery UPPP due to recovering ETOH   Status: Hold For - Scheduling,Retrospective By Protocol Authorization  Requested for:  [id number]     Patient Discussion/Summary     This is a 53 year  old female with a history of severe ARPIT and CPAP intolerance who presented to Dr. Corado for surgical options for ARPIT management. We discussed that given her anatomy, first line of treatment would include a UPPP. We counseled her that although we feel this procedure is clearly indicated, given her degree of apnea, we cannot guarantee that this will be entirely curative. She discussed with us her history of alcoholism and concern regarding postoperative pain control with narcotics after UPPP. She also has an allergy to NSAIDS. We will refer her to pain management to assist with this. We have also ordered a CT face to evaluate her prior genioplasty to help determine if she could benefit from bony work at the same time. Will see her back once more before surgery.      Chief Complaint     New patient visit for sleep apnea, intolerance to PAP, large tonsils and narrow airway      History of Present IllnessMs. DOMINGO , KODY 1970, referred for an initial consultation with a long history of reported loud snoring, waking up feeling unrefreshed, excessive daytime fatigue. [place] Sleepiness Scale Score is 16 /24. Fatigue Severity Scale Score is 43 /63. Snoring VAS 10/10     Sleep study histories: (personally reviewed raw data such as interpretation report, data sheet, hypnogram, and titration table)  The patient has been previously diagnosed with obstructive sleep apnea (ARPIT), in a home sleep test performed on 2023, that showed an Apnea Hypopnea Index (AHI) of 68.1 events per hour. The 3% oxygen desaturation index (RIK) was 66.1 events per hour. During that night, the lowest 02 saturation was 75 %, and the patient spent 60 minutes of total sleep time with 02 sat less than 88%. During supine sleep the AHI was 78.3 events per hour and non supine sleep was 35.7 events per hour     For nocturnal symptoms (without or prior to treatment), she endorses that there is  snoring, witnessed apneas, nocturia and restless  "sleep.     For daytime symptoms (without or prior to treatment), he endorses that there is morning headaches, irritability and depression.     Patient does reports nasal obstruction. It does fluctuate, and has been treated with nasal corticosteroids without significant improvements. He does not have a history of nasal surgery or upper airway surgery. He does not have a history of nasal trauma. He does not describe a history of facial pressure type of headaches. He does not report a history of nasal drainage, denies epistaxis.   NOSE- 8 / 20  Nasal Obstruction VAS- 1/10     Ms. Stearns presents with severe ARPIT, she is compliant but intolerant to CPAP. She feels fatigued throughout the day despite wearing the CPAP. She has a history of rhinoplasty and genioplasty in the past. Her mother has a \"congenital narrowing of the airway\" and she feels she has a similar condition. She denies any nasal obstruction, breathes well through her nose. She had braces as a child and still wears a retainer. She has not tried an oral appliance. She works at  in the clinical Receptor department. Smokes 2-3 cigarettes per day.      Active Problems   · Abnormal mammogram (793.80) (R92.8)   · Angioedema of lips, initial encounter (995.1) (T78.3XXA)   · Benign essential hypertension (401.1) (I10)   · Encounter for screening mammogram for breast cancer (V76.12) (Z12.31)   · BOB (generalized anxiety disorder) (300.02) (F41.1)   · Goiter (240.9) (E04.9)   · Lower limb length difference (736.81) (M21.70)   · Menopause (627.2) (Z78.0)   · Multinodular thyroid (241.1) (E04.2)   · Obstructive sleep apnea (327.23) (G47.33)   · Other fatigue (780.79) (R53.83)   · Pain, neck (723.1) (M54.2)   · Psoriasis (696.1) (L40.9)     Past Medical History   · History of varicella (V12.09) (Z86.19)     Surgical History   · History of Repair Of Extraoral Mandible With Bone Plate   · History of Rhinoplasty     Family History   · Family history of hypertension " (V17.49) (Z82.49)   · Family history of hypothyroidism (V18.19) (Z83.49)   · Family history of Seasonal allergic rhinitis, unspecified allergic rhinitis trigger   · Family history of Alcohol abuse, in remission   · Family history of Anxiety   · Family history of Diabetes mellitus of other type without complication   · Family history of depression (V17.0) (Z81.8)   · Family history of hypertension (V17.49) (Z82.49)   · Family history of Obsessive-compulsive behavior   · Family history of Seasonal allergic rhinitis, unspecified allergic rhinitis trigger   · Family history of Anxiety   · Family history of depression (V17.0) (Z81.8)   · Family history of hemochromatosis (V18.19) (Z83.49)   · Family history of dementia (V17.2) (Z81.8)   · Family history of Alcohol abuse   · Family history of Alcohol abuse   · Family history of Anxiety   · Family history of depression (V17.0) (Z81.8)   · Family history of Anxiety   · Family history of depression (V17.0) (Z81.8)     Social History   · Alcoholism in recovery (303.93) (F10.21)   · Caffeine use (V49.89) (Z78.9)   · Current smoker on some days (305.1) (F17.200)   · Does not use illicit drugs (V49.89) (Z78.9)   · Denied: History of Drug use   · Feels safe at home   · No alcohol use     Allergies   · Ibuprofen TABS  Recorded By: Carolyn Hirsch; 12/22/2016 4:27:59 PM     Current Meds     Medication Name Instruction   Atenolol 25 MG Oral Tablet TAKE 1 TABLET DAILY.   EPINEPHrine 0.3 MG/0.3ML Injection Solution Auto-injector USE AS DIRECTED   Mometasone Furoate 0.1 % External Ointment APPLY THIN LAYER TO AFFECTED AREAS ON FACE TWICE A DAY X 7 DAYS, THEN DISCONTINUE.   Multi-Vitamin/Iron Oral Tablet TAKE 1 TABLET ONCE DAILY.   Tretinoin 0.05 % External Cream APPLY A PEA SIZED AMOUNT TO ENTIRE FACE EVERY NIGHT   Triamcinolone Acetonide 0.1 % External Cream APPLY TO AFFECTED AREA TWICE A DAY      Vitals  Vital Signs     Recorded: [id number] 11:10AM   Temperature 98.3 F   Weight 129 lb  9.6 oz   BMI Calculated 24.49 kg/m2   BSA Calculated 1.57   Tobacco Use a) Yes   PHQ-2  #1. Over the last 2 weeks have you felt down, depressed or hopeless?  (If yes, answer PHQ-9 below) Yes   PHQ-2  #2. Over the last 2 weeks have you felt little interest  or pleasure in doing things?  (If yes, answer PHQ-9 below) Yes   Falls Screening (Age 18+) a) No falls within the last year      Physical Exam  GENERAL: Well-developed, well-nourished.      EYES: Ocular motility intact.      EARS: Otoscopy of external auditory canals and tympanic membranes is normal with clinical speech reception thresholds grossly intact. No mass/lesion of auricle.      NOSE:   Anterior rhinoscopy shows there is left septal deviation and bilateral inferior turbinate hypertrophy.  Right Turbinate: 2  Left Turbinate: 2      NECK: No cervical lymphadenopathy, no neck mass/crepitus/ asymmetry, trachea is midline, no thyroid enlargement/tenderness/mass.      OROPHARYNX:   Tonsil size: 3+  Modified Mallampatti tongue position: 2  Tongue position is Freidman 2  Scalloping is noted.   Soft Palate: is midly high arched     MAXILLOFACIAL:   On frontal repose, patient shows symmetrical facial thirds with retrusion of maxilla. Symmetrical facial fifths.  There is not paranasal flattening.  There is not deep nasolabial folds.        DENTAL:  The occlusal plane is not steep. Overjet and Overbite are adequate.   Right - Class 2 canine, Class 2 molar  Left - Class 2 canine, Class 2 molar  There is transverse discrepancy with narrow maxilla.  The maxilla is highly-arched.   Tongue shows teeth indentations.   Dentition: fair     NEURO/PSYCH: Cranial nerves grossly intact, oriented x3 (time, place, person), appropriate mood and affect.      RESPIRATION: Symmetric expansion during respiration, normal respiratory effort.      CARDIOVASCULAR: Pulse is regular rhythm and rate      Procedure: Diagnostic Nasal/ Pharyngeal Endoscopy     Indication for procedure: To  evaluate static and dynamic upper anatomy not visible by anterior rhinoscopy and oral cavitiy examination for anatomic risk factors of obstructive sleep apnea. Also to evaluate extension of polyposis.      Anesthesia: 1% phenylephrine,4% lidocaine topical spray     Description:   A flexible endoscope was used to examine the left and right nasal cavities. The nasal valve areas were examined for abnormalities or collapse. The inferior turbinates are significantly hypertrophied. Several polyps in both meatus are noted with drainage of thick mucus from middle and superior meatuses. The scope was then passed through the nasopharyngeal, oropharyngeal, and hypopharyngeal airway. The Aparicio Maneuver was performed with the patient in sitting position.Collapse was assessed during a maximal inspiratory effort against a closed mouth and sealed nose. The patient tolerated the procedure without complications and was returned to ambulatory status.      Nasopharynx: There is not adenoid hypertrophy.   Oropharynx: There is severe palatine tonsillar hypertrophy.   Hypopharynx: There is moderate lingual tonsillar hypertrophy, with lingual tonsils of Grade 1. Vocal Cord position with Grade 1 view. With Mehta maneuver, base of tongue collapse is grade 1.            'Scores and Scales'  -Sleep QOL Set     Goal [id number]   NOSE (20) TS       Snoring VAS TS       ESS (24) TS   11   GLENDA (28) TS   22   FOSQ (40) TS   26

## 2024-03-28 ENCOUNTER — ALLIED HEALTH (OUTPATIENT)
Dept: INTEGRATIVE MEDICINE | Facility: CLINIC | Age: 54
End: 2024-03-28

## 2024-03-28 DIAGNOSIS — M79.18 CERVICAL MYOFASCIAL PAIN SYNDROME: ICD-10-CM

## 2024-03-28 PROCEDURE — MASS(60EMP) MASSAGE 60 MINUTES EMPLOYEE RATE: Performed by: MASSAGE THERAPIST

## 2024-03-29 ENCOUNTER — OFFICE VISIT (OUTPATIENT)
Dept: OTOLARYNGOLOGY | Facility: CLINIC | Age: 54
End: 2024-03-29
Payer: COMMERCIAL

## 2024-03-29 VITALS
WEIGHT: 126 LBS | HEIGHT: 62 IN | HEART RATE: 72 BPM | TEMPERATURE: 98.2 F | BODY MASS INDEX: 23.19 KG/M2 | SYSTOLIC BLOOD PRESSURE: 141 MMHG | DIASTOLIC BLOOD PRESSURE: 92 MMHG

## 2024-03-29 DIAGNOSIS — G47.33 OSA (OBSTRUCTIVE SLEEP APNEA): Primary | ICD-10-CM

## 2024-03-29 PROCEDURE — 3080F DIAST BP >= 90 MM HG: CPT

## 2024-03-29 PROCEDURE — 99214 OFFICE O/P EST MOD 30 MIN: CPT

## 2024-03-29 PROCEDURE — 3077F SYST BP >= 140 MM HG: CPT

## 2024-03-29 RX ORDER — LEVONORGESTREL/ETHIN.ESTRADIOL 0.1-0.02MG
TABLET ORAL
COMMUNITY
Start: 2018-10-18

## 2024-03-29 SDOH — ECONOMIC STABILITY: FOOD INSECURITY: WITHIN THE PAST 12 MONTHS, YOU WORRIED THAT YOUR FOOD WOULD RUN OUT BEFORE YOU GOT MONEY TO BUY MORE.: NEVER TRUE

## 2024-03-29 SDOH — ECONOMIC STABILITY: FOOD INSECURITY: WITHIN THE PAST 12 MONTHS, THE FOOD YOU BOUGHT JUST DIDN'T LAST AND YOU DIDN'T HAVE MONEY TO GET MORE.: NEVER TRUE

## 2024-03-29 ASSESSMENT — COLUMBIA-SUICIDE SEVERITY RATING SCALE - C-SSRS
6. HAVE YOU EVER DONE ANYTHING, STARTED TO DO ANYTHING, OR PREPARED TO DO ANYTHING TO END YOUR LIFE?: NO
2. HAVE YOU ACTUALLY HAD ANY THOUGHTS OF KILLING YOURSELF?: NO
1. IN THE PAST MONTH, HAVE YOU WISHED YOU WERE DEAD OR WISHED YOU COULD GO TO SLEEP AND NOT WAKE UP?: NO

## 2024-03-29 ASSESSMENT — ENCOUNTER SYMPTOMS
DEPRESSION: 0
LOSS OF SENSATION IN FEET: 0
OCCASIONAL FEELINGS OF UNSTEADINESS: 0

## 2024-03-29 ASSESSMENT — LIFESTYLE VARIABLES
SKIP TO QUESTIONS 9-10: 1
AUDIT-C TOTAL SCORE: 0
HOW OFTEN DO YOU HAVE A DRINK CONTAINING ALCOHOL: NEVER
HOW MANY STANDARD DRINKS CONTAINING ALCOHOL DO YOU HAVE ON A TYPICAL DAY: PATIENT DOES NOT DRINK
HOW OFTEN DO YOU HAVE SIX OR MORE DRINKS ON ONE OCCASION: NEVER

## 2024-03-29 ASSESSMENT — PATIENT HEALTH QUESTIONNAIRE - PHQ9
SUM OF ALL RESPONSES TO PHQ9 QUESTIONS 1 AND 2: 0
2. FEELING DOWN, DEPRESSED OR HOPELESS: NOT AT ALL
1. LITTLE INTEREST OR PLEASURE IN DOING THINGS: NOT AT ALL

## 2024-03-29 ASSESSMENT — PAIN SCALES - GENERAL: PAINLEVEL: 0-NO PAIN

## 2024-03-29 NOTE — PROGRESS NOTES
Massage Therapy Visit:     Wanda Stearns was self-referred.    Condition of Client Subjective :  Patient ID: Wanda Stearns is a 54 y.o. female who presents for reason for visit of Muscle tension release.  HPI    Session Information  Visit Type: New patient  Description of present complaint: Muscle tension, Anxiety, Discomfort, Stress, Chronic pain, Range of motion (ROM)    Review of Systems    Objective   Pre-treatment Assessment  Condition of Client Note: Requests focus on cervicals, shoulders.    Physical Exam    Actions Assessment/Plan :    Massage Treatment  Patient Position: Table, Supine  Positioning Assistance: Did not need assistance, Pillow(s)/bolster under knees while supine  Massage Technique: Therapeutic massage, Superficial fascial release, Stretching, Soft tissue mobilization, Myofascial release, Relaxation massage  Area/Body Region: Upper body  Pressure Scale: 2 - Mild pressure, 3 - Medium pressure, 4 - Moderate pressure  Action Note: Cervical/upper body, kneading, stripping, palming, effleurage, petrissage, cross fiber, on SCM, SITS, Levator Scapulae, Trapezius, Rhomboids, upper Pectoralis, Deltoids.  UE, stretching, cross fiber, palming, kneading, effleurage, on Deltoids, Biceps, Triceps, forearm muscles, hand muscles.  Light traction on cervicals, trapezius, rhomboids, SITS.    Response:  Post-treatment Assessment  Patient Fell Asleep During Treatment: No  Patient Noted Improvement of the Following Symptoms: Muscle tension, ROM    Evaluation:   Massage Therapy Evaluation / Recommendation(s) / Follow-up  Post-Treatment Recommendation: Increase hydration  Follow-up: Follow up recommended, as needed.  4-6 weeks.

## 2024-04-25 ENCOUNTER — APPOINTMENT (OUTPATIENT)
Dept: INTEGRATIVE MEDICINE | Facility: CLINIC | Age: 54
End: 2024-04-25
Payer: COMMERCIAL

## 2024-07-18 ENCOUNTER — TELEPHONE (OUTPATIENT)
Dept: PRIMARY CARE | Facility: CLINIC | Age: 54
End: 2024-07-18
Payer: COMMERCIAL

## 2024-09-26 NOTE — PROGRESS NOTES
10/4/2024 Follow up visit -6 months      3/29/2024 Follow up visit     Post op visit sp UPPP on 11/13/2023     Patient new sleep study shows AHI 3% 13.6 previous AHI was 66.1. New AHI 4% 8.8                                           Only 3 minutes bellow 88% saturation previous previous 60 minutes of total sleep time with 02 sat less than 88%                                             Patient reports significantly better sleep and waking up felling refreshened.      By all standards patient surgery was a success (Sheer and modified Sheer criteria) and patient is discharged.         Patient returns today on day 7 of Preservation Palatoplasty with Palatoplexy. Patient reports sore throat still significant but has been able to take control with medication. Patient has taken postop medication correctly. Today exam shows scabbing on throat with adequate healing.  Patient is going to continue nasal saline rinses and return for remaining follow-ups.      Patient presents today for pre-op appointment to Uvulopalatopharyngoplasty     The role of palatoplasty to reduce ARPIT burden was discussed. Risks including post-op bleeding, post-op significant pain, changes in taste and continuous tongue pain, amongst others were discussed and all questions answered.     Patient expresses understanding and after considering risks, benefits, and alternatives, surgical management was elected.      Orders   · CT Facial Bones; Status:Hold For - Scheduling,Retrospective By Protocol Authorization;  Requested for:20Sep2023;   Patient taking Metformin or Derivatives? : Unknown  Radiologist to Determine Optimal Study : Y  What are the patient's signs and symptoms? : ARPIT   · Pain Management Referral Evaluation and Treatment  Evaluate & Treat for post op pain  management due to pt prefers no narcotics after surgery UPPP due to recovering ETOH   Status: Hold For - Scheduling,Retrospective By Protocol Authorization  Requested for:  20Sep2023      Patient Discussion/Summary     This is a 53 year old female with a history of severe ARPIT and CPAP intolerance who presented to Dr. Corado for surgical options for ARPIT management. We discussed that given her anatomy, first line of treatment would include a UPPP. We counseled her that although we feel this procedure is clearly indicated, given her degree of apnea, we cannot guarantee that this will be entirely curative. She discussed with us her history of alcoholism and concern regarding postoperative pain control with narcotics after UPPP. She also has an allergy to NSAIDS. We will refer her to pain management to assist with this. We have also ordered a CT face to evaluate her prior genioplasty to help determine if she could benefit from bony work at the same time. Will see her back once more before surgery.      Chief Complaint     New patient visit for sleep apnea, intolerance to PAP, large tonsils and narrow airway      History of Present IllnessMs. DOMINGO ,  1970, referred for an initial consultation with a long history of reported loud snoring, waking up feeling unrefreshed, excessive daytime fatigue. [place] Sleepiness Scale Score is 16 /24. Fatigue Severity Scale Score is 43 /63. Snoring VAS 10/10     Sleep study histories: (personally reviewed raw data such as interpretation report, data sheet, hypnogram, and titration table)  The patient has been previously diagnosed with obstructive sleep apnea (ARPIT), in a home sleep test performed on 2023, that showed an Apnea Hypopnea Index (AHI) of 68.1 events per hour. The 3% oxygen desaturation index (RIK) was 66.1 events per hour. During that night, the lowest 02 saturation was 75 %, and the patient spent 60 minutes of total sleep time with 02 sat less than 88%. During supine sleep the AHI was 78.3 events per hour and non supine sleep was 35.7 events per hour     For nocturnal symptoms (without or prior to treatment), she endorses that there  "is  snoring, witnessed apneas, nocturia and restless sleep.     For daytime symptoms (without or prior to treatment), he endorses that there is morning headaches, irritability and depression.     Patient does reports nasal obstruction. It does fluctuate, and has been treated with nasal corticosteroids without significant improvements. He does not have a history of nasal surgery or upper airway surgery. He does not have a history of nasal trauma. He does not describe a history of facial pressure type of headaches. He does not report a history of nasal drainage, denies epistaxis.   NOSE- 8 / 20  Nasal Obstruction VAS- 1/10     Ms. Stearns presents with severe ARPIT, she is compliant but intolerant to CPAP. She feels fatigued throughout the day despite wearing the CPAP. She has a history of rhinoplasty and genioplasty in the past. Her mother has a \"congenital narrowing of the airway\" and she feels she has a similar condition. She denies any nasal obstruction, breathes well through her nose. She had braces as a child and still wears a retainer. She has not tried an oral appliance. 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 Smokes 2-3 cigarettes per day.      Active Problems   · Abnormal mammogram (793.80) (R92.8)   · Angioedema of lips, initial encounter (995.1) (T78.3XXA)   · Benign essential hypertension (401.1) (I10)   · Encounter for screening mammogram for breast cancer (V76.12) (Z12.31)   · BOB (generalized anxiety disorder) (300.02) (F41.1)   · Goiter (240.9) (E04.9)   · Lower limb length difference (736.81) (M21.70)   · Menopause (627.2) (Z78.0)   · Multinodular thyroid (241.1) (E04.2)   · Obstructive sleep apnea (327.23) (G47.33)   · Other fatigue (780.79) (R53.83)   · Pain, neck (723.1) (M54.2)   · Psoriasis (696.1) (L40.9)     Past Medical History   · History of varicella (V12.09) (Z86.19)     Surgical History   · History of Repair Of Extraoral Mandible With Bone Plate   · History of Rhinoplasty   "   Family History   · Family history of hypertension (V17.49) (Z82.49)   · Family history of hypothyroidism (V18.19) (Z83.49)   · Family history of Seasonal allergic rhinitis, unspecified allergic rhinitis trigger   · Family history of Alcohol abuse, in remission   · Family history of Anxiety   · Family history of Diabetes mellitus of other type without complication   · Family history of depression (V17.0) (Z81.8)   · Family history of hypertension (V17.49) (Z82.49)   · Family history of Obsessive-compulsive behavior   · Family history of Seasonal allergic rhinitis, unspecified allergic rhinitis trigger   · Family history of Anxiety   · Family history of depression (V17.0) (Z81.8)   · Family history of hemochromatosis (V18.19) (Z83.49)   · Family history of dementia (V17.2) (Z81.8)   · Family history of Alcohol abuse   · Family history of Alcohol abuse   · Family history of Anxiety   · Family history of depression (V17.0) (Z81.8)   · Family history of Anxiety   · Family history of depression (V17.0) (Z81.8)     Social History   · Alcoholism in recovery (303.93) (F10.21)   · Caffeine use (V49.89) (Z78.9)   · Current smoker on some days (305.1) (F17.200)   · Does not use illicit drugs (V49.89) (Z78.9)   · Denied: History of Drug use   · Feels safe at home   · No alcohol use     Allergies   · Ibuprofen TABS  Recorded By: Carolyn Hirsch; 12/22/2016 4:27:59 PM     Current Meds     Medication Name Instruction   Atenolol 25 MG Oral Tablet TAKE 1 TABLET DAILY.   EPINEPHrine 0.3 MG/0.3ML Injection Solution Auto-injector USE AS DIRECTED   Mometasone Furoate 0.1 % External Ointment APPLY THIN LAYER TO AFFECTED AREAS ON FACE TWICE A DAY X 7 DAYS, THEN DISCONTINUE.   Multi-Vitamin/Iron Oral Tablet TAKE 1 TABLET ONCE DAILY.   Tretinoin 0.05 % External Cream APPLY A PEA SIZED AMOUNT TO ENTIRE FACE EVERY NIGHT   Triamcinolone Acetonide 0.1 % External Cream APPLY TO AFFECTED AREA TWICE A DAY      Vitals  Vital Signs     Recorded:  [id number] 11:10AM   Temperature 98.3 F   Weight 129 lb 9.6 oz   BMI Calculated 24.49 kg/m2   BSA Calculated 1.57   Tobacco Use a) Yes   PHQ-2  #1. Over the last 2 weeks have you felt down, depressed or hopeless?  (If yes, answer PHQ-9 below) Yes   PHQ-2  #2. Over the last 2 weeks have you felt little interest  or pleasure in doing things?  (If yes, answer PHQ-9 below) Yes   Falls Screening (Age 18+) a) No falls within the last year      Physical Exam  GENERAL: Well-developed, well-nourished.      EYES: Ocular motility intact.      EARS: Otoscopy of external auditory canals and tympanic membranes is normal with clinical speech reception thresholds grossly intact. No mass/lesion of auricle.      NOSE:   Anterior rhinoscopy shows there is left septal deviation and bilateral inferior turbinate hypertrophy.  Right Turbinate: 2  Left Turbinate: 2      NECK: No cervical lymphadenopathy, no neck mass/crepitus/ asymmetry, trachea is midline, no thyroid enlargement/tenderness/mass.      OROPHARYNX:   Tonsil size: 3+  Modified Mallampatti tongue position: 2  Tongue position is Freidman 2  Scalloping is noted.   Soft Palate: is midly high arched     MAXILLOFACIAL:   On frontal repose, patient shows symmetrical facial thirds with retrusion of maxilla. Symmetrical facial fifths.  There is not paranasal flattening.  There is not deep nasolabial folds.        DENTAL:  The occlusal plane is not steep. Overjet and Overbite are adequate.   Right - Class 2 canine, Class 2 molar  Left - Class 2 canine, Class 2 molar  There is transverse discrepancy with narrow maxilla.  The maxilla is highly-arched.   Tongue shows teeth indentations.   Dentition: fair     NEURO/PSYCH: Cranial nerves grossly intact, oriented x3 (time, place, person), appropriate mood and affect.      RESPIRATION: Symmetric expansion during respiration, normal respiratory effort.      CARDIOVASCULAR: Pulse is regular rhythm and rate      Procedure: Diagnostic Nasal/  Pharyngeal Endoscopy     Indication for procedure: To evaluate static and dynamic upper anatomy not visible by anterior rhinoscopy and oral cavitiy examination for anatomic risk factors of obstructive sleep apnea. Also to evaluate extension of polyposis.      Anesthesia: 1% phenylephrine,4% lidocaine topical spray     Description:   A flexible endoscope was used to examine the left and right nasal cavities. The nasal valve areas were examined for abnormalities or collapse. The inferior turbinates are significantly hypertrophied. Several polyps in both meatus are noted with drainage of thick mucus from middle and superior meatuses. The scope was then passed through the nasopharyngeal, oropharyngeal, and hypopharyngeal airway. The Aparicio Maneuver was performed with the patient in sitting position.Collapse was assessed during a maximal inspiratory effort against a closed mouth and sealed nose. The patient tolerated the procedure without complications and was returned to ambulatory status.      Nasopharynx: There is not adenoid hypertrophy.   Oropharynx: There is severe palatine tonsillar hypertrophy.   Hypopharynx: There is moderate lingual tonsillar hypertrophy, with lingual tonsils of Grade 1. Vocal Cord position with Grade 1 view. With Mehta maneuver, base of tongue collapse is grade 1.            'Scores and Scales'  -Sleep QOL Set     Goal [id number]   NOSE (20) TS       Snoring VAS TS       ESS (24) TS   11   GLENDA (28) TS   22   FOSQ (40) TS   26

## 2024-10-04 ENCOUNTER — APPOINTMENT (OUTPATIENT)
Dept: OTOLARYNGOLOGY | Facility: CLINIC | Age: 54
End: 2024-10-04
Payer: COMMERCIAL

## 2024-12-01 ENCOUNTER — OFFICE VISIT (OUTPATIENT)
Dept: URGENT CARE | Age: 54
End: 2024-12-01
Payer: COMMERCIAL

## 2024-12-01 VITALS
HEART RATE: 80 BPM | RESPIRATION RATE: 16 BRPM | TEMPERATURE: 98.4 F | SYSTOLIC BLOOD PRESSURE: 159 MMHG | DIASTOLIC BLOOD PRESSURE: 93 MMHG | OXYGEN SATURATION: 99 %

## 2024-12-01 DIAGNOSIS — B02.9 HERPES ZOSTER WITHOUT COMPLICATION: Primary | ICD-10-CM

## 2024-12-01 RX ORDER — CYCLOBENZAPRINE HCL 10 MG
10 TABLET ORAL 3 TIMES DAILY PRN
Qty: 21 TABLET | Refills: 0 | Status: SHIPPED | OUTPATIENT
Start: 2024-12-01 | End: 2024-12-08

## 2024-12-01 RX ORDER — ACYCLOVIR 800 MG/1
800 TABLET ORAL
Qty: 35 TABLET | Refills: 0 | Status: SHIPPED | OUTPATIENT
Start: 2024-12-01 | End: 2024-12-08

## 2024-12-01 NOTE — LETTER
December 1, 2024     Patient: Wanda Stearns   YOB: 1970   Date of Visit: 12/1/2024       To Whom It May Concern:    It is my medical opinion that Wanda Stearns may return to work on 12/3/24 .    If you have any questions or concerns, please don't hesitate to call.         Sincerely,        Dianna Rico, DANA-CNP    CC: No Recipients

## 2024-12-01 NOTE — PROGRESS NOTES
Subjective   Patient ID: Wanda Stearns is a 54 y.o. female. They present today with a chief complaint of Rash (Tingly, sharp and painful rash on right side of head, neck and ear X 5 days. DELGADO).    History of Present Illness  HPI    Patient presents to urgent care for complaints of a painful rash on the right side of her head radiating down to her right side of neck and ear for 4 days.  Patient was concerned for possible shingles.    Past Medical History  Allergies as of 12/01/2024 - Reviewed 12/01/2024   Allergen Reaction Noted    Ace inhibitors Angioedema 08/15/2023    Ibuprofen Angioedema and Other 08/15/2023       (Not in a hospital admission)       Past Medical History:   Diagnosis Date    Personal history of other infectious and parasitic diseases     History of varicella       Past Surgical History:   Procedure Laterality Date    OTHER SURGICAL HISTORY  12/13/2017    Repair Of Extraoral Mandible With Bone Plate    RHINOPLASTY  12/22/2016    Rhinoplasty        reports that she quit smoking about 23 months ago. Her smoking use included cigarettes. She started smoking about 34 years ago. She has a 8.3 pack-year smoking history. She does not have any smokeless tobacco history on file. She reports that she does not drink alcohol.    Review of Systems  Review of Systems   Skin:  Positive for rash.                                  Objective    Vitals:    12/01/24 1214   BP: (!) 159/93   Pulse: 80   Resp: 16   Temp: 36.9 °C (98.4 °F)   SpO2: 99%     No LMP recorded.    Physical Exam  Vitals reviewed.   Cardiovascular:      Rate and Rhythm: Normal rate and regular rhythm.   Pulmonary:      Effort: Pulmonary effort is normal.      Breath sounds: Normal breath sounds.   Skin:     General: Skin is warm.      Findings: Rash present. Rash is vesicular (located on right side of neck and base of right side of head).   Neurological:      Mental Status: She is alert.         Procedures    Point of Care Test & Imaging  Results from this visit  No results found for this visit on 12/01/24.   No results found.    Diagnostic study results (if any) were reviewed by KE Serrano.    Assessment/Plan   Allergies, medications, history, and pertinent labs/EKGs/Imaging reviewed by KE Serrano.     Medical Decision Making  Rash consistent with shingles.  Patient states she has had the rash for 4 days, explained the patient that the acyclovir may not help, but we can try it.  Patient also reports shooting pain going up her neck and prescription for muscle relaxer given.  Patient was told if the pain is not improving then recommend following up with your primary care provider.    Orders and Diagnoses  Diagnoses and all orders for this visit:  Herpes zoster without complication  -     acyclovir (Zovirax) 800 mg tablet; Take 1 tablet (800 mg) by mouth 5 times a day for 7 days.  -     cyclobenzaprine (Flexeril) 10 mg tablet; Take 1 tablet (10 mg) by mouth 3 times a day as needed for muscle spasms for up to 7 days.      Medical Admin Record      Patient disposition: Home    Electronically signed by KE Serrano  12:28 PM

## (undated) DEVICE — SOLUTION, ANTI FOG, W/SPONGE, ENDOMATE

## (undated) DEVICE — COAGULATOR, SUCTION, LECTROVAC, 10 FR, 6 IN

## (undated) DEVICE — TIP, ELECTROSURGICAL, 4IN BLADE, MODIFIED, EXTENDED

## (undated) DEVICE — TUBING, CLEAR N-COND, 5MM X 10, LF

## (undated) DEVICE — GLIDESCOPE BLADE, SPECTRUM SU, LOPRO S3

## (undated) DEVICE — BASIN KIT, SINGLE

## (undated) DEVICE — TUBE, SALEM SUMP, 16 FR X 48IN, ENFIT

## (undated) DEVICE — TIP, SUCTION, FRAZIER, 12 FR

## (undated) DEVICE — SPONGE, TONSIL, DOUBLE STRUNG, LARGE, 1 IN

## (undated) DEVICE — SLEEVE, VASO PRESS, CALF GARMENT, MEDIUM, GREEN

## (undated) DEVICE — SYRINGE, 60 CC, IRRIGATION, BULB, CONTRO-BULB, PAPER POUCH

## (undated) DEVICE — Device

## (undated) DEVICE — CORD, CAUTERY, BIOPOLAR FORCEP, 12FT